# Patient Record
Sex: FEMALE | NOT HISPANIC OR LATINO | Employment: FULL TIME | URBAN - METROPOLITAN AREA
[De-identification: names, ages, dates, MRNs, and addresses within clinical notes are randomized per-mention and may not be internally consistent; named-entity substitution may affect disease eponyms.]

---

## 2023-11-21 ENCOUNTER — OFFICE VISIT (OUTPATIENT)
Dept: OBSTETRICS AND GYNECOLOGY | Facility: CLINIC | Age: 33
End: 2023-11-21
Payer: COMMERCIAL

## 2023-11-21 ENCOUNTER — PREP FOR PROCEDURE (OUTPATIENT)
Dept: OBSTETRICS AND GYNECOLOGY | Facility: HOSPITAL | Age: 33
End: 2023-11-21

## 2023-11-21 VITALS — WEIGHT: 128.7 LBS | DIASTOLIC BLOOD PRESSURE: 82 MMHG | SYSTOLIC BLOOD PRESSURE: 128 MMHG

## 2023-11-21 DIAGNOSIS — N83.201 CYST OF RIGHT OVARY: ICD-10-CM

## 2023-11-21 DIAGNOSIS — Z01.419 WELL WOMAN EXAM WITH ROUTINE GYNECOLOGICAL EXAM: Primary | ICD-10-CM

## 2023-11-21 DIAGNOSIS — Z30.09 ENCOUNTER FOR CONSULTATION FOR FEMALE STERILIZATION: Primary | ICD-10-CM

## 2023-11-21 DIAGNOSIS — Z30.09 ENCOUNTER FOR CONSULTATION FOR FEMALE STERILIZATION: ICD-10-CM

## 2023-11-21 PROCEDURE — 99385 PREV VISIT NEW AGE 18-39: CPT | Performed by: OBSTETRICS & GYNECOLOGY

## 2023-11-21 PROCEDURE — 87624 HPV HI-RISK TYP POOLED RSLT: CPT | Performed by: OBSTETRICS & GYNECOLOGY

## 2023-11-21 PROCEDURE — 1036F TOBACCO NON-USER: CPT | Performed by: OBSTETRICS & GYNECOLOGY

## 2023-11-21 PROCEDURE — 88175 CYTOPATH C/V AUTO FLUID REDO: CPT | Mod: TC | Performed by: OBSTETRICS & GYNECOLOGY

## 2023-11-21 RX ORDER — ACETAMINOPHEN 325 MG/1
975 TABLET ORAL ONCE
Status: CANCELLED | OUTPATIENT
Start: 2023-11-21 | End: 2023-11-21

## 2023-11-21 ASSESSMENT — ENCOUNTER SYMPTOMS
OCCASIONAL FEELINGS OF UNSTEADINESS: 0
DEPRESSION: 0
LOSS OF SENSATION IN FEET: 0

## 2023-11-21 ASSESSMENT — PATIENT HEALTH QUESTIONNAIRE - PHQ9
1. LITTLE INTEREST OR PLEASURE IN DOING THINGS: NOT AT ALL
SUM OF ALL RESPONSES TO PHQ9 QUESTIONS 1 AND 2: 0
2. FEELING DOWN, DEPRESSED OR HOPELESS: NOT AT ALL

## 2023-11-21 NOTE — ASSESSMENT & PLAN NOTE
Alternatives to lap b/l salpingectomy reviewed, and pt wishes to have tubes removed.  Risks/benefits/alternatives reviewed, and appropriate consents signed.

## 2023-11-21 NOTE — PROGRESS NOTES
"Myrna Townsend \"Dilip\" is a 33 y.o. female who is here for a routine exam. Periods are regular every 28-30 days, lasting a few days. Dysmenorrhea:none. Cyclic symptoms include none. No intermenstrual bleeding, spotting, or discharge.  Hx of migraines with combo OCP.  Notes pelvic pain, with hx of recurrent right ovarian cyst.  Pt also wants permanent sterilization.      Last pap:  2020  Last mammogram  Current contraception: none  History of abnormal Pap smear: no  Family history of uterine or ovarian cancer: no  Regular self breast exam: yes  History of abnormal mammogram: no  Family history of breast cancer: no  History of abnormal lipids: no  Menstrual History:  OB History          2    Para        Term                AB   2    Living             SAB   1    IAB   1    Ectopic        Multiple        Live Births                      Patient's last menstrual period was 10/30/2023 (approximate).         Past Medical History:   Diagnosis Date    Fibromyalgia        Past Surgical History:   Procedure Laterality Date    INDUCED       NASAL SEPTUM SURGERY      ORTHOPEDIC SURGERY Left     Arm fracture repair    OTHER SURGICAL HISTORY      egg donation x3       Social History     Socioeconomic History    Marital status:      Spouse name: Not on file    Number of children: 0    Years of education: Not on file    Highest education level: Not on file   Occupational History    Not on file   Tobacco Use    Smoking status: Never    Smokeless tobacco: Never   Vaping Use    Vaping Use: Never used   Substance and Sexual Activity    Alcohol use: Not Currently    Drug use: Yes     Types: Marijuana    Sexual activity: Yes     Birth control/protection: None   Other Topics Concern    Not on file   Social History Narrative    Not on file     Social Determinants of Health     Financial Resource Strain: Not on file   Food Insecurity: Not on file   Transportation Needs: Not on file "   Physical Activity: Not on file   Stress: Not on file   Social Connections: Not on file   Intimate Partner Violence: Not on file   Housing Stability: Not on file       Family History   Problem Relation Name Age of Onset    Diabetes Mother      Endometriosis Father's Sister      Diabetes Maternal Grandmother      Cancer Maternal Grandfather      Autoimmune disease Paternal Grandmother         Prior to Admission medications    Medication Sig Start Date End Date Taking? Authorizing Provider   loratadine (Claritin) 5 mg chewable tablet Chew 1 tablet (5 mg) once daily.    Historical Provider, MD       Allergies   Allergen Reactions    Amoxicillin Unknown    Gabapentin Nausea/vomiting    Penicillin Unknown              Objective   /82   Wt 58.4 kg (128 lb 11.2 oz)   LMP 10/30/2023 (Approximate)     Physical Exam  Vitals and nursing note reviewed.   Constitutional:       General: She is not in acute distress.     Appearance: Normal appearance. She is not ill-appearing.   HENT:      Head: Normocephalic and atraumatic.      Mouth/Throat:      Mouth: Mucous membranes are moist.      Pharynx: Oropharynx is clear.   Eyes:      Extraocular Movements: Extraocular movements intact.      Conjunctiva/sclera: Conjunctivae normal.      Pupils: Pupils are equal, round, and reactive to light.   Neck:      Thyroid: No thyroid mass, thyromegaly or thyroid tenderness.   Cardiovascular:      Rate and Rhythm: Normal rate and regular rhythm.      Pulses: Normal pulses.      Heart sounds: Normal heart sounds. No murmur heard.  Pulmonary:      Effort: Pulmonary effort is normal.      Breath sounds: No wheezing or rhonchi.   Chest:   Breasts:     Right: Normal. No swelling, bleeding, inverted nipple, mass, nipple discharge, skin change or tenderness.      Left: Normal. No swelling, bleeding, inverted nipple, mass, nipple discharge, skin change or tenderness.   Abdominal:      General: Bowel sounds are normal. There is no distension.       Palpations: There is no mass.      Tenderness: There is no abdominal tenderness. There is no guarding or rebound.      Hernia: No hernia is present. There is no hernia in the left inguinal area or right inguinal area.   Genitourinary:     General: Normal vulva.      Pubic Area: No rash.       Labia:         Right: No rash, tenderness, lesion or injury.         Left: No rash, tenderness, lesion or injury.       Urethra: No prolapse or urethral swelling.      Vagina: No signs of injury. No vaginal discharge, tenderness or prolapsed vaginal walls.      Cervix: No cervical motion tenderness, discharge, friability, lesion, erythema or cervical bleeding.      Uterus: Not deviated, not enlarged, not fixed, not tender and no uterine prolapse.       Adnexa:         Right: No mass, tenderness or fullness.          Left: No mass, tenderness or fullness.     Musculoskeletal:         General: No swelling, tenderness, deformity or signs of injury. Normal range of motion.      Cervical back: No rigidity.   Lymphadenopathy:      Cervical: No cervical adenopathy.      Upper Body:      Right upper body: No axillary adenopathy.      Left upper body: No axillary adenopathy.      Lower Body: No right inguinal adenopathy. No left inguinal adenopathy.   Skin:     General: Skin is warm.      Findings: No lesion or rash.   Neurological:      General: No focal deficit present.      Mental Status: She is alert and oriented to person, place, and time.   Psychiatric:         Mood and Affect: Mood normal.         Behavior: Behavior normal.         Thought Content: Thought content normal.         Judgment: Judgment normal.         Assessment    Problem List Items Addressed This Visit       Encounter for consultation for female sterilization     Alternatives to lap b/l salpingectomy reviewed, and pt wishes to have tubes removed.  Risks/benefits/alternatives reviewed, and appropriate consents signed.         Cyst of right ovary     Ultrasound  ordered.  If appropriate, can add ovarian cystectomy to procedure.           Relevant Orders    US pelvis transvaginal     Other Visit Diagnoses       Well woman exam with routine gynecological exam    -  Primary    Relevant Orders    THINPREP PAP             Follow up for post op.   All questions answered.  Await pap smear results.  Breast self exam technique reviewed and patient encouraged to perform self-exam monthly.  Contraception: tubal ligation.  Diagnosis explained in detail, including differential.  Discussed healthy lifestyle modifications.  Pelvic ultrasound..

## 2023-12-01 ENCOUNTER — HOSPITAL ENCOUNTER (OUTPATIENT)
Dept: RADIOLOGY | Facility: HOSPITAL | Age: 33
Discharge: HOME | End: 2023-12-01
Payer: COMMERCIAL

## 2023-12-01 DIAGNOSIS — N83.201 CYST OF RIGHT OVARY: ICD-10-CM

## 2023-12-01 PROCEDURE — 76856 US EXAM PELVIC COMPLETE: CPT

## 2023-12-14 ENCOUNTER — LAB (OUTPATIENT)
Dept: LAB | Facility: LAB | Age: 33
End: 2023-12-14
Payer: COMMERCIAL

## 2023-12-14 ENCOUNTER — PRE-ADMISSION TESTING (OUTPATIENT)
Dept: PREADMISSION TESTING | Facility: HOSPITAL | Age: 33
End: 2023-12-14
Payer: COMMERCIAL

## 2023-12-14 ENCOUNTER — LAB REQUISITION (OUTPATIENT)
Dept: LAB | Facility: HOSPITAL | Age: 33
End: 2023-12-14
Payer: COMMERCIAL

## 2023-12-14 VITALS
TEMPERATURE: 96.8 F | DIASTOLIC BLOOD PRESSURE: 95 MMHG | WEIGHT: 127.43 LBS | SYSTOLIC BLOOD PRESSURE: 139 MMHG | HEART RATE: 96 BPM | OXYGEN SATURATION: 99 % | HEIGHT: 62 IN | BODY MASS INDEX: 23.45 KG/M2

## 2023-12-14 DIAGNOSIS — Z30.09 ENCOUNTER FOR CONSULTATION FOR FEMALE STERILIZATION: Primary | ICD-10-CM

## 2023-12-14 DIAGNOSIS — Z30.09 ENCOUNTER FOR CONSULTATION FOR FEMALE STERILIZATION: ICD-10-CM

## 2023-12-14 DIAGNOSIS — Z30.09 ENCOUNTER FOR OTHER GENERAL COUNSELING AND ADVICE ON CONTRACEPTION: ICD-10-CM

## 2023-12-14 LAB
ABO GROUP (TYPE) IN BLOOD: NORMAL
ANION GAP SERPL CALC-SCNC: 11 MMOL/L
ANTIBODY SCREEN: NORMAL
BUN SERPL-MCNC: 9 MG/DL (ref 8–25)
CALCIUM SERPL-MCNC: 9.6 MG/DL (ref 8.5–10.4)
CHLORIDE SERPL-SCNC: 104 MMOL/L (ref 97–107)
CO2 SERPL-SCNC: 26 MMOL/L (ref 24–31)
CREAT SERPL-MCNC: 0.7 MG/DL (ref 0.4–1.6)
ERYTHROCYTE [DISTWIDTH] IN BLOOD BY AUTOMATED COUNT: 12.9 % (ref 11.5–14.5)
GFR SERPL CREATININE-BSD FRML MDRD: >90 ML/MIN/1.73M*2
GLUCOSE SERPL-MCNC: 95 MG/DL (ref 65–99)
HCT VFR BLD AUTO: 40.4 % (ref 36–46)
HGB BLD-MCNC: 13.1 G/DL (ref 12–16)
MCH RBC QN AUTO: 29.2 PG (ref 26–34)
MCHC RBC AUTO-ENTMCNC: 32.4 G/DL (ref 32–36)
MCV RBC AUTO: 90 FL (ref 80–100)
NRBC BLD-RTO: 0 /100 WBCS (ref 0–0)
PLATELET # BLD AUTO: 320 X10*3/UL (ref 150–450)
POTASSIUM SERPL-SCNC: 4.6 MMOL/L (ref 3.4–5.1)
RBC # BLD AUTO: 4.49 X10*6/UL (ref 4–5.2)
RH FACTOR (ANTIGEN D): NORMAL
SODIUM SERPL-SCNC: 141 MMOL/L (ref 133–145)
WBC # BLD AUTO: 6.4 X10*3/UL (ref 4.4–11.3)

## 2023-12-14 PROCEDURE — 85027 COMPLETE CBC AUTOMATED: CPT

## 2023-12-14 PROCEDURE — 36415 COLL VENOUS BLD VENIPUNCTURE: CPT

## 2023-12-14 PROCEDURE — 80048 BASIC METABOLIC PNL TOTAL CA: CPT

## 2023-12-14 PROCEDURE — 94760 N-INVAS EAR/PLS OXIMETRY 1: CPT

## 2023-12-14 PROCEDURE — 99203 OFFICE O/P NEW LOW 30 MIN: CPT | Performed by: PHYSICIAN ASSISTANT

## 2023-12-14 ASSESSMENT — CHADS2 SCORE
HYPERTENSION: NO
CHADS2 SCORE: 0
PRIOR STROKE OR TIA OR THROMBOEMBOLISM: NO
CHF: NO
AGE GREATER THAN OR EQUAL TO 75: NO
DIABETES: NO

## 2023-12-14 ASSESSMENT — PAIN - FUNCTIONAL ASSESSMENT: PAIN_FUNCTIONAL_ASSESSMENT: 0-10

## 2023-12-14 ASSESSMENT — LIFESTYLE VARIABLES: SMOKING_STATUS: NONSMOKER

## 2023-12-14 ASSESSMENT — PAIN SCALES - GENERAL: PAINLEVEL_OUTOF10: 0 - NO PAIN

## 2023-12-14 NOTE — PREPROCEDURE INSTRUCTIONS
Medication List            Accurate as of December 14, 2023  7:19 AM. Always use your most recent med list.                loratadine 5 mg chewable tablet  Commonly known as: Claritin                              NPO Instructions:    Do not eat any food after midnight the night before your surgery/procedure.    Additional Instructions:     Day of Surgery:  Wear  comfortable loose fitting clothing  All jewelry and valuables should be left at home    PAT DISCHARGE INSTRUCTIONS    Please call the Same Day Surgery (SDS) Department of the hospital where your procedure will be performed after 2:00 PM the day before your surgery. If you are scheduled on a Monday, or a Tuesday following a Monday holiday, you will need to call on the last business day prior to your surgery.    [facility]  [address] [facility], [id number] [phone]    [facility]  [address] [place] [address] [phone]    [facility]  [address] [facility].  [place] [id number] [phone]    Please let your surgeon know if:      You develop any open sores, shingles, burning or painful urination as these may increase your risk of an infection.   You no longer wish to have the surgery.   Any other personal circumstances change that may lead to the need to cancel or defer this surgery-such as being sick or getting admitted to any hospital within one week of your planned procedure.    Your contact details change, such as a change of address or phone number.    Starting now:     Please DO NOT drink alcohol or smoke for 24 hours before surgery. It is well known that quitting smoking can make a huge difference to your health and recovery from surgery. The longer you abstain from smoking, the better your chances of a healthy recovery. If you need help with quitting, call 3-755-QUIT-NOW to be connected to a trained counselor who will discuss  the best methods to help you quit.     Before your surgery:    Please stop all supplements 7 days prior to surgery. Or as directed by your surgeon.   Please stop taking NSAID pain medicine such as Advil and Motrin 7 days before surgery.    If you develop any fever, cough, cold, rashes, cuts, scratches, scrapes, urinary symptoms or infection anywhere on your body (including teeth and gums) prior to surgery, please call your surgeon’s office as soon as possible. This may require treatment to reduce the chance of cancellation on the day of surgery.    The day before your surgery:   DIET- Do not eat any food after MIDNIGHT. May have 10 ounces of CLEAR LIQUIDS until TWO HOURS before your arrival time. This includes water, black tea or coffee (no milk ir cream), apple juice and electrolyte drinks (Gatorade). May chew gum until TWO hours before your surgery time.   Get a good night’s rest.  Use the special soap for bathing if you have been instructed to use one.    Scheduled surgery times may change and you will be notified if this occurs - please check your personal voicemail for any updates.     On the morning of surgery:   Wear comfortable, loose fitting clothes which open in the front. Please do not wear moisturizers, creams, lotions, makeup or perfume.    Please bring with you to surgery:   Photo ID and insurance card   Current list of medicines and allergies   Pacemaker/ Defibrillator/Heart stent cards   CPAP machine and mask    Slings/ splints/ crutches   A copy of your complete advanced directive/DHPOA.    Please do NOT bring with you to surgery:   All jewelry and valuables should be left at home.   Prosthetic devices such as contact lenses, hearing aids, dentures, eyelash extensions, hairpins and body piercings must be removed prior to going in to the surgical suite.    After outpatient surgery:   A responsible adult MUST accompany you at the time of discharge and stay with you for 24 hours after your surgery. You  may NOT drive yourself home after surgery.    Do not drive, operate machinery, make critical decisions or do activities that require co-ordination or balance until after a night’s sleep.   Do not drink alcoholic beverages for 24 hours.   Instructions for resuming your medications will be provided by your surgeon.    CALL YOUR DOCTOR AFTER SURGERY IF YOU HAVE:     Chills and/or a fever of 101° F or higher.    Redness, swelling, pus or drainage from your surgical wound or a bad smell from the wound.    Lightheadedness, fainting or confusion.    Persistent vomiting (throwing up) and are not able to eat or drink for 12 hours.    Three or more loose, watery bowel movements in 24 hours (diarrhea).   Difficulty or pain while urinating( after non-urological surgery)    Pain and swelling in your legs, especially if it is only on one side.    Difficulty breathing or are breathing faster than normal.    Any new concerning symptoms.

## 2023-12-14 NOTE — H&P (VIEW-ONLY)
"CPM/PAT Evaluation       Name: Aaliyah Townsend (Aaliyah Townsend \"Dilip\")  /Age: 1990/33 y.o.     In-Person       Chief Complaint: Desires sterilization    HPI 33-year-old female desires sterilization.  Patient has history of anxiety, postop nausea vomiting and fibromyalgia.  Patient is scheduled for laparoscopic bilateral salpingectomy 2023    Past Medical History:   Diagnosis Date    Anxiety 2019    Treating with talk therapy/ CBT    Anxiety 2019    Treating with talk therapy/ CBT    Chronic headaches     Chronic pain disorder     Fibromyalgia     Fibromyalgia, primary 2021    Not sure this is a correct diagnosis, I think it might just be neck pain and  low iron/easily bruise    Fibromyalgia, primary 2021    Not sure this is a correct diagnosis, I think it might just be neck pain and  low iron/easily bruise    Fractures , ,     All healed, arm, foot, ankle    Joint pain     Neck , shoulder, hip and knee pain on right side    Other specified health status     Known health problems: none    PONV (postoperative nausea and vomiting) 2014    After egg retrieval surgery       Past Surgical History:   Procedure Laterality Date    INDUCED       NASAL SEPTUM SURGERY      ORTHOPEDIC SURGERY Left     Arm fracture repair    OTHER SURGICAL HISTORY  2014    History Of Prior Surgery    OTHER SURGICAL HISTORY      egg donation x3       Patient  reports being sexually active and has had partner(s) who are male. She reports using the following method of birth control/protection: None.    Family History   Problem Relation Name Age of Onset    Diabetes Mother Sandra Perez     Depression Mother Sandra Perez     Drug abuse Mother Sandra Perez     Endometriosis Father's Sister      Diabetes Maternal Grandmother Kiara Perez     Cancer Maternal Grandfather Kiara Perez     Autoimmune disease Paternal Grandmother Waleska Garcia     Miscarriages / Stillbirths Paternal " "Grandmother Waleska Garcia     Drug abuse Brother Tee Garcia     Miscarriages / Stillbirths Sister Yuri Garcia     Miscarriages / Stillbirths Other Bindu Camacho (paternal aunt)        Allergies   Allergen Reactions    Gabapentin Nausea/vomiting    Amoxicillin Rash and Nausea/vomiting    Penicillin Rash and Nausea/vomiting       Current Outpatient Medications   Medication Instructions    loratadine (CLARITIN) 5 mg, oral, Daily     Review of Systems   All other systems reviewed and are negative.       Physical Exam  Vitals reviewed. Physical exam within normal limits.   Constitutional:       Appearance: Normal appearance.   HENT:      Head: Normocephalic and atraumatic.      Mouth/Throat:      Mouth: Mucous membranes are moist.   Eyes:      Extraocular Movements: Extraocular movements intact.      Pupils: Pupils are equal, round, and reactive to light.   Cardiovascular:      Rate and Rhythm: Normal rate and regular rhythm.      Pulses: Normal pulses.      Heart sounds: Normal heart sounds.   Pulmonary:      Effort: Pulmonary effort is normal.      Breath sounds: Normal breath sounds.   Abdominal:      General: Bowel sounds are normal.      Palpations: Abdomen is soft.   Musculoskeletal:         General: Normal range of motion.      Cervical back: Normal range of motion.   Skin:     General: Skin is warm and dry.   Neurological:      General: No focal deficit present.      Mental Status: She is alert and oriented to person, place, and time.   Psychiatric:         Mood and Affect: Mood normal.         Behavior: Behavior normal.         Thought Content: Thought content normal.         Judgment: Judgment normal.          PAT AIRWAY:   Airway:     Mallampati::  II    TM distance::  >3 FB    Neck ROM::  Full      Vitals  Heart Rate:  [96]   Temp:  [36 °C (96.8 °F)]   BP: (139)/(95)   Height:  [157.5 cm (5' 2\")]   Weight:  [57.8 kg (127 lb 6.8 oz)]   SpO2:  [99 %]        DASI Risk Score    No data to display       Caprini " DVT Assessment    No data to display       Modified Frailty Index    No data to display       CHADS2 Stroke Risk  Current as of 5 hours ago        N/A 3 - 100%: High Risk   2 - 3%: Medium Risk   0 - 2%: Low Risk     Last Change: N/A          This score determines the patient's risk of having a stroke if the patient has atrial fibrillation.        This score is not applicable to this patient. Components are not calculated.          Revised Cardiac Risk Index    No data to display       Apfel Simplified Score    No data to display       Risk Analysis Index Results This Encounter    No data found in the last 1 encounters.       Stop Bang Score      Flowsheet Row Most Recent Value   Do you snore loudly? 0   Do you often feel tired or fatigued after your sleep? 0   Has anyone ever observed you stop breathing in your sleep? 0   Do you have or are you being treated for high blood pressure? 0   Recent BMI (Calculated) 23.3   Is BMI greater than 35 kg/m2? 0=No   Age older than 50 years old? 0=No   Is your neck circumference greater than 17 inches (Male) or 16 inches (Female)? 0   Gender - Male 0=No   STOP-BANG Total Score 0            Assessment and Plan:     1.  Desires sterilization   Plan: Laparoscopic bilateral salpingectomy 12/21/2023    2.  ASA 2

## 2023-12-20 ENCOUNTER — ANESTHESIA EVENT (OUTPATIENT)
Dept: OPERATING ROOM | Facility: HOSPITAL | Age: 33
End: 2023-12-20
Payer: COMMERCIAL

## 2023-12-21 ENCOUNTER — ANESTHESIA (OUTPATIENT)
Dept: OPERATING ROOM | Facility: HOSPITAL | Age: 33
End: 2023-12-21
Payer: COMMERCIAL

## 2023-12-21 ENCOUNTER — PHARMACY VISIT (OUTPATIENT)
Dept: PHARMACY | Facility: CLINIC | Age: 33
End: 2023-12-21
Payer: COMMERCIAL

## 2023-12-21 ENCOUNTER — HOSPITAL ENCOUNTER (OUTPATIENT)
Facility: HOSPITAL | Age: 33
Setting detail: OUTPATIENT SURGERY
Discharge: HOME | End: 2023-12-21
Attending: OBSTETRICS & GYNECOLOGY | Admitting: OBSTETRICS & GYNECOLOGY
Payer: COMMERCIAL

## 2023-12-21 VITALS
RESPIRATION RATE: 20 BRPM | OXYGEN SATURATION: 99 % | TEMPERATURE: 97.2 F | SYSTOLIC BLOOD PRESSURE: 107 MMHG | HEART RATE: 72 BPM | DIASTOLIC BLOOD PRESSURE: 75 MMHG

## 2023-12-21 DIAGNOSIS — Z30.09 ENCOUNTER FOR CONSULTATION FOR FEMALE STERILIZATION: ICD-10-CM

## 2023-12-21 LAB
ABO GROUP (TYPE) IN BLOOD: NORMAL
PREGNANCY TEST URINE, POC: NEGATIVE
RH FACTOR (ANTIGEN D): NORMAL

## 2023-12-21 PROCEDURE — 3700000001 HC GENERAL ANESTHESIA TIME - INITIAL BASE CHARGE: Performed by: OBSTETRICS & GYNECOLOGY

## 2023-12-21 PROCEDURE — 2500000004 HC RX 250 GENERAL PHARMACY W/ HCPCS (ALT 636 FOR OP/ED): Performed by: NURSE ANESTHETIST, CERTIFIED REGISTERED

## 2023-12-21 PROCEDURE — 2500000005 HC RX 250 GENERAL PHARMACY W/O HCPCS: Performed by: NURSE ANESTHETIST, CERTIFIED REGISTERED

## 2023-12-21 PROCEDURE — 81025 URINE PREGNANCY TEST: CPT | Performed by: ANESTHESIOLOGY

## 2023-12-21 PROCEDURE — 7100000002 HC RECOVERY ROOM TIME - EACH INCREMENTAL 1 MINUTE: Performed by: OBSTETRICS & GYNECOLOGY

## 2023-12-21 PROCEDURE — 3700000002 HC GENERAL ANESTHESIA TIME - EACH INCREMENTAL 1 MINUTE: Performed by: OBSTETRICS & GYNECOLOGY

## 2023-12-21 PROCEDURE — 7100000009 HC PHASE TWO TIME - INITIAL BASE CHARGE: Performed by: OBSTETRICS & GYNECOLOGY

## 2023-12-21 PROCEDURE — 94760 N-INVAS EAR/PLS OXIMETRY 1: CPT

## 2023-12-21 PROCEDURE — 2500000004 HC RX 250 GENERAL PHARMACY W/ HCPCS (ALT 636 FOR OP/ED): Performed by: OBSTETRICS & GYNECOLOGY

## 2023-12-21 PROCEDURE — 58661 LAPAROSCOPY REMOVE ADNEXA: CPT | Performed by: OBSTETRICS & GYNECOLOGY

## 2023-12-21 PROCEDURE — 36415 COLL VENOUS BLD VENIPUNCTURE: CPT | Performed by: ANESTHESIOLOGY

## 2023-12-21 PROCEDURE — RXMED WILLOW AMBULATORY MEDICATION CHARGE

## 2023-12-21 PROCEDURE — A4649 SURGICAL SUPPLIES: HCPCS | Performed by: OBSTETRICS & GYNECOLOGY

## 2023-12-21 PROCEDURE — A58661 PR LAP,RMV  ADNEXAL STRUCTURE: Performed by: ANESTHESIOLOGY

## 2023-12-21 PROCEDURE — 2720000007 HC OR 272 NO HCPCS: Performed by: OBSTETRICS & GYNECOLOGY

## 2023-12-21 PROCEDURE — 2500000004 HC RX 250 GENERAL PHARMACY W/ HCPCS (ALT 636 FOR OP/ED): Performed by: ANESTHESIOLOGY

## 2023-12-21 PROCEDURE — 7100000001 HC RECOVERY ROOM TIME - INITIAL BASE CHARGE: Performed by: OBSTETRICS & GYNECOLOGY

## 2023-12-21 PROCEDURE — 3600000003 HC OR TIME - INITIAL BASE CHARGE - PROCEDURE LEVEL THREE: Performed by: OBSTETRICS & GYNECOLOGY

## 2023-12-21 PROCEDURE — 3600000008 HC OR TIME - EACH INCREMENTAL 1 MINUTE - PROCEDURE LEVEL THREE: Performed by: OBSTETRICS & GYNECOLOGY

## 2023-12-21 PROCEDURE — 7100000010 HC PHASE TWO TIME - EACH INCREMENTAL 1 MINUTE: Performed by: OBSTETRICS & GYNECOLOGY

## 2023-12-21 PROCEDURE — A58661 PR LAP,RMV  ADNEXAL STRUCTURE: Performed by: NURSE ANESTHETIST, CERTIFIED REGISTERED

## 2023-12-21 PROCEDURE — 2500000005 HC RX 250 GENERAL PHARMACY W/O HCPCS: Performed by: OBSTETRICS & GYNECOLOGY

## 2023-12-21 PROCEDURE — 88302 TISSUE EXAM BY PATHOLOGIST: CPT | Mod: TC | Performed by: OBSTETRICS & GYNECOLOGY

## 2023-12-21 PROCEDURE — 88302 TISSUE EXAM BY PATHOLOGIST: CPT | Performed by: PATHOLOGY

## 2023-12-21 RX ORDER — IBUPROFEN 600 MG/1
600 TABLET ORAL EVERY 6 HOURS PRN
Qty: 20 TABLET | Refills: 0 | Status: SHIPPED | OUTPATIENT
Start: 2023-12-21

## 2023-12-21 RX ORDER — SODIUM CHLORIDE, SODIUM LACTATE, POTASSIUM CHLORIDE, CALCIUM CHLORIDE 600; 310; 30; 20 MG/100ML; MG/100ML; MG/100ML; MG/100ML
100 INJECTION, SOLUTION INTRAVENOUS CONTINUOUS
Status: DISCONTINUED | OUTPATIENT
Start: 2023-12-21 | End: 2023-12-21 | Stop reason: HOSPADM

## 2023-12-21 RX ORDER — KETOROLAC TROMETHAMINE 15 MG/ML
INJECTION, SOLUTION INTRAMUSCULAR; INTRAVENOUS AS NEEDED
Status: DISCONTINUED | OUTPATIENT
Start: 2023-12-21 | End: 2023-12-21

## 2023-12-21 RX ORDER — PROPOFOL 10 MG/ML
INJECTION, EMULSION INTRAVENOUS AS NEEDED
Status: DISCONTINUED | OUTPATIENT
Start: 2023-12-21 | End: 2023-12-21

## 2023-12-21 RX ORDER — MIDAZOLAM HYDROCHLORIDE 1 MG/ML
INJECTION, SOLUTION INTRAMUSCULAR; INTRAVENOUS AS NEEDED
Status: DISCONTINUED | OUTPATIENT
Start: 2023-12-21 | End: 2023-12-21

## 2023-12-21 RX ORDER — ONDANSETRON HYDROCHLORIDE 2 MG/ML
4 INJECTION, SOLUTION INTRAVENOUS ONCE AS NEEDED
Status: DISCONTINUED | OUTPATIENT
Start: 2023-12-21 | End: 2023-12-21 | Stop reason: HOSPADM

## 2023-12-21 RX ORDER — ACETAMINOPHEN 325 MG/1
650 TABLET ORAL EVERY 6 HOURS PRN
Qty: 20 TABLET | Refills: 0 | Status: SHIPPED | OUTPATIENT
Start: 2023-12-21

## 2023-12-21 RX ORDER — MIDAZOLAM HYDROCHLORIDE 1 MG/ML
1 INJECTION, SOLUTION INTRAMUSCULAR; INTRAVENOUS ONCE AS NEEDED
Status: DISCONTINUED | OUTPATIENT
Start: 2023-12-21 | End: 2023-12-21 | Stop reason: HOSPADM

## 2023-12-21 RX ORDER — ROCURONIUM BROMIDE 10 MG/ML
INJECTION, SOLUTION INTRAVENOUS AS NEEDED
Status: DISCONTINUED | OUTPATIENT
Start: 2023-12-21 | End: 2023-12-21

## 2023-12-21 RX ORDER — ONDANSETRON HYDROCHLORIDE 2 MG/ML
INJECTION, SOLUTION INTRAVENOUS AS NEEDED
Status: DISCONTINUED | OUTPATIENT
Start: 2023-12-21 | End: 2023-12-21

## 2023-12-21 RX ORDER — DEXAMETHASONE SODIUM PHOSPHATE 4 MG/ML
INJECTION, SOLUTION INTRA-ARTICULAR; INTRALESIONAL; INTRAMUSCULAR; INTRAVENOUS; SOFT TISSUE AS NEEDED
Status: DISCONTINUED | OUTPATIENT
Start: 2023-12-21 | End: 2023-12-21

## 2023-12-21 RX ORDER — LIDOCAINE HYDROCHLORIDE 10 MG/ML
INJECTION INFILTRATION; PERINEURAL AS NEEDED
Status: DISCONTINUED | OUTPATIENT
Start: 2023-12-21 | End: 2023-12-21

## 2023-12-21 RX ORDER — MEPERIDINE HYDROCHLORIDE 25 MG/ML
12.5 INJECTION INTRAMUSCULAR; INTRAVENOUS; SUBCUTANEOUS EVERY 10 MIN PRN
Status: DISCONTINUED | OUTPATIENT
Start: 2023-12-21 | End: 2023-12-21 | Stop reason: HOSPADM

## 2023-12-21 RX ORDER — ALBUTEROL SULFATE 0.83 MG/ML
2.5 SOLUTION RESPIRATORY (INHALATION) ONCE
Status: DISCONTINUED | OUTPATIENT
Start: 2023-12-21 | End: 2023-12-21 | Stop reason: HOSPADM

## 2023-12-21 RX ORDER — FENTANYL CITRATE 50 UG/ML
50 INJECTION, SOLUTION INTRAMUSCULAR; INTRAVENOUS EVERY 5 MIN PRN
Status: DISCONTINUED | OUTPATIENT
Start: 2023-12-21 | End: 2023-12-21 | Stop reason: HOSPADM

## 2023-12-21 RX ORDER — ACETAMINOPHEN 325 MG/1
975 TABLET ORAL ONCE
Status: COMPLETED | OUTPATIENT
Start: 2023-12-21 | End: 2023-12-21

## 2023-12-21 RX ORDER — LIDOCAINE HYDROCHLORIDE AND EPINEPHRINE 10; 10 MG/ML; UG/ML
INJECTION, SOLUTION INFILTRATION; PERINEURAL AS NEEDED
Status: DISCONTINUED | OUTPATIENT
Start: 2023-12-21 | End: 2023-12-21 | Stop reason: HOSPADM

## 2023-12-21 RX ORDER — FENTANYL CITRATE 50 UG/ML
INJECTION, SOLUTION INTRAMUSCULAR; INTRAVENOUS AS NEEDED
Status: DISCONTINUED | OUTPATIENT
Start: 2023-12-21 | End: 2023-12-21

## 2023-12-21 RX ORDER — LIDOCAINE HYDROCHLORIDE 10 MG/ML
0.1 INJECTION INFILTRATION; PERINEURAL ONCE
Status: DISCONTINUED | OUTPATIENT
Start: 2023-12-21 | End: 2023-12-21 | Stop reason: HOSPADM

## 2023-12-21 RX ORDER — HYDROCODONE BITARTRATE AND ACETAMINOPHEN 5; 325 MG/1; MG/1
1 TABLET ORAL EVERY 6 HOURS PRN
Qty: 12 TABLET | Refills: 0 | Status: SHIPPED | OUTPATIENT
Start: 2023-12-21 | End: 2023-12-28

## 2023-12-21 RX ADMIN — ONDANSETRON HYDROCHLORIDE 4 MG: 2 INJECTION INTRAMUSCULAR; INTRAVENOUS at 08:46

## 2023-12-21 RX ADMIN — PROPOFOL 200 MG: 10 INJECTION, EMULSION INTRAVENOUS at 08:06

## 2023-12-21 RX ADMIN — DEXAMETHASONE SODIUM PHOSPHATE 8 MG: 4 INJECTION, SOLUTION INTRA-ARTICULAR; INTRALESIONAL; INTRAMUSCULAR; INTRAVENOUS; SOFT TISSUE at 08:13

## 2023-12-21 RX ADMIN — MEPERIDINE HYDROCHLORIDE 12.5 MG: 25 INJECTION INTRAMUSCULAR; INTRAVENOUS; SUBCUTANEOUS at 09:38

## 2023-12-21 RX ADMIN — ACETAMINOPHEN 975 MG: 325 TABLET ORAL at 07:10

## 2023-12-21 RX ADMIN — HYDROMORPHONE HYDROCHLORIDE 0.2 MG: 0.2 INJECTION, SOLUTION INTRAMUSCULAR; INTRAVENOUS; SUBCUTANEOUS at 09:16

## 2023-12-21 RX ADMIN — LIDOCAINE HYDROCHLORIDE 4 ML: 10 INJECTION, SOLUTION INFILTRATION; PERINEURAL at 08:06

## 2023-12-21 RX ADMIN — SODIUM CHLORIDE, SODIUM LACTATE, POTASSIUM CHLORIDE, AND CALCIUM CHLORIDE: 600; 310; 30; 20 INJECTION, SOLUTION INTRAVENOUS at 09:00

## 2023-12-21 RX ADMIN — ROCURONIUM BROMIDE 30 MG: 10 INJECTION, SOLUTION INTRAVENOUS at 08:06

## 2023-12-21 RX ADMIN — SUGAMMADEX 150 MG: 100 INJECTION, SOLUTION INTRAVENOUS at 08:56

## 2023-12-21 RX ADMIN — MIDAZOLAM HYDROCHLORIDE 2 MG: 1 INJECTION, SOLUTION INTRAMUSCULAR; INTRAVENOUS at 07:58

## 2023-12-21 RX ADMIN — KETOROLAC TROMETHAMINE 15 MG: 15 INJECTION, SOLUTION INTRAMUSCULAR; INTRAVENOUS at 08:46

## 2023-12-21 RX ADMIN — SODIUM CHLORIDE, SODIUM LACTATE, POTASSIUM CHLORIDE, AND CALCIUM CHLORIDE: 600; 310; 30; 20 INJECTION, SOLUTION INTRAVENOUS at 07:56

## 2023-12-21 RX ADMIN — FENTANYL CITRATE 50 MCG: 0.05 INJECTION, SOLUTION INTRAMUSCULAR; INTRAVENOUS at 08:06

## 2023-12-21 RX ADMIN — MEPERIDINE HYDROCHLORIDE 12.5 MG: 25 INJECTION INTRAMUSCULAR; INTRAVENOUS; SUBCUTANEOUS at 09:26

## 2023-12-21 RX ADMIN — FENTANYL CITRATE 50 MCG: 0.05 INJECTION, SOLUTION INTRAMUSCULAR; INTRAVENOUS at 08:18

## 2023-12-21 ASSESSMENT — PAIN SCALES - GENERAL
PAINLEVEL_OUTOF10: 3
PAINLEVEL_OUTOF10: 0 - NO PAIN
PAINLEVEL_OUTOF10: 4
PAINLEVEL_OUTOF10: 0 - NO PAIN
PAINLEVEL_OUTOF10: 4
PAINLEVEL_OUTOF10: 4

## 2023-12-21 ASSESSMENT — PAIN - FUNCTIONAL ASSESSMENT
PAIN_FUNCTIONAL_ASSESSMENT: 0-10

## 2023-12-21 ASSESSMENT — COLUMBIA-SUICIDE SEVERITY RATING SCALE - C-SSRS
1. IN THE PAST MONTH, HAVE YOU WISHED YOU WERE DEAD OR WISHED YOU COULD GO TO SLEEP AND NOT WAKE UP?: NO
6. HAVE YOU EVER DONE ANYTHING, STARTED TO DO ANYTHING, OR PREPARED TO DO ANYTHING TO END YOUR LIFE?: NO
2. HAVE YOU ACTUALLY HAD ANY THOUGHTS OF KILLING YOURSELF?: NO

## 2023-12-21 ASSESSMENT — PAIN DESCRIPTION - LOCATION: LOCATION: ABDOMEN

## 2023-12-21 NOTE — ANESTHESIA POSTPROCEDURE EVALUATION
"Patient: Aaliyah Townsend \"Dilip\"    Procedure Summary       Date: 12/21/23 Room / Location: [facility] / [facility]    Anesthesia Start: 0756 Anesthesia Stop: 0909    Procedure: Salpingectomy Laparoscopy (Bilateral) Diagnosis:       Encounter for consultation for female sterilization      (Encounter for consultation for female sterilization [Z30.09])    Surgeons: Jaclyn Reese DO Responsible Provider: Kevin Min DO    Anesthesia Type: general ASA Status: 1            Anesthesia Type: general    Vitals Value Taken Time   BP 96/58 12/21/23 1015   Temp 36.2 °C (97.2 °F) 12/21/23 1010   Pulse 68 12/21/23 1018   Resp 14 12/21/23 1018   SpO2 100 % 12/21/23 1023   Vitals shown include unvalidated device data.    Anesthesia Post Evaluation    Patient location during evaluation: bedside  Patient participation: complete - patient participated  Level of consciousness: awake  Pain management: adequate  Airway patency: patent  Cardiovascular status: acceptable  Respiratory status: acceptable  Hydration status: acceptable  Postoperative Nausea and Vomiting: none        There were no known notable events for this encounter.    "

## 2023-12-21 NOTE — ANESTHESIA PROCEDURE NOTES
Airway  Date/Time: 12/21/2023 8:10 AM  Urgency: elective    Airway not difficult    Staffing  Performed: SRNA   Authorized by: Kevin Min DO    Performed by: MONSERRAT Watkins-CRNA  Patient location during procedure: OR    Indications and Patient Condition  Indications for airway management: anesthesia  Spontaneous Ventilation: absent  Sedation level: deep  Preoxygenated: yes  Patient position: sniffing  MILS maintained throughout  Mask difficulty assessment: 1 - vent by mask    Final Airway Details  Final airway type: endotracheal airway      Successful airway: ETT  Cuffed: yes   Successful intubation technique: direct laryngoscopy  Facilitating devices/methods: intubating stylet  Endotracheal tube insertion site: oral  Blade: Devon  Blade size: #3  ETT size (mm): 7.0  Cormack-Lehane Classification: grade I - full view of glottis  Placement verified by: chest auscultation and capnometry   Measured from: teeth  ETT to teeth (cm): 21  Number of attempts at approach: 1    Additional Comments  Atraumatic intubation per SRNA under supervision

## 2023-12-21 NOTE — ANESTHESIA PREPROCEDURE EVALUATION
"Patient: Aaliyah Townsend \"Dilip\"    Procedure Information       Date/Time: 12/21/23 0745    Procedure: Salpingectomy Laparoscopy (Bilateral)    Location: [facility] / [facility]    Surgeons: Jaclyn Reese, DO            Relevant Problems   Anesthesia (within normal limits)       Clinical information reviewed:   Tobacco  Allergies  Meds   Med Hx  Surg Hx  OB Status  Fam Hx  Soc   Hx        NPO Detail:  NPO/Void Status  Date of Last Liquid: 12/20/23  Time of Last Liquid: 2000  Date of Last Solid: 12/20/23  Time of Last Solid: 2000  Time of Last Void: 0647         Physical Exam    Airway  Mallampati: I  TM distance: >3 FB     Cardiovascular    Dental - normal exam     Pulmonary    Abdominal          Anesthesia Plan    ASA 1     general     intravenous induction   Anesthetic plan and risks discussed with patient.    "

## 2023-12-21 NOTE — OP NOTE
"Salpingectomy Laparoscopy (B) Operative Note     Date: 2023  OR Location: [facility]    Name: Aaliyah Townsend \"Dilip\", : 1990, Age: 33 y.o., MRN: [id number], Sex: female    Diagnosis  Pre-op Diagnosis     * Encounter for consultation for female sterilization [Z30.09] Post-op Diagnosis     * Encounter for consultation for female sterilization [Z30.09]     Procedures  Salpingectomy Laparoscopy  22266 - IL LAPAROSCOPY W/RMVL ADNEXAL STRUCTURES    IL LAPAROSCOPY W/RMVL ADNEXAL STRUCTURES [77431]  Surgeons      * Jaclyn Reese - Primary    Resident/Fellow/Other Assistant:  Surgeon(s) and Role: : none    Procedure Summary  Anesthesia: General  ASA: I  Anesthesia Staff: Anesthesiologist: Kevin Min DO  CRNA: MONSERRAT Watkins-PATI  Estimated Blood Loss: mL  Intra-op Medications:   Medication Name Total Dose   lidocaine-epinephrine (Xylocaine W/EPI) 1 %-1:100,000 injection 10 mL   lactated Ringer's bolus 500 mL Cannot be calculated              Anesthesia Record               Intraprocedure I/O Totals          Intake    lactated Ringer's bolus 500 mL 1000.00 mL    Total Intake 1000 mL       Output    Urine 300 mL    Est. Blood Loss 5 mL    NG/OG Tube Output 50 mL    Total Output 355 mL       Net    Net Volume 645 mL          Specimen:   ID Type Source Tests Collected by Time   1 : BILATERAL FALLOPIAN TUBES Tissue FALLOPIAN TUBE SALPINGECTOMY LEFT AND RIGHT SURGICAL PATHOLOGY EXAM Jaclyn Reese DO 2023 0843        Staff:   Circulator: Dez Alfaro RN; Nidhi Pope RN  Scrub Person: Kyle Miller RN         Drains and/or Catheters:   [REMOVED] NG/OG/Feeding Tube 16 Fr (Removed)       Tourniquet Times:         Implants:     Findings: uterus sounded to 7 cm.  Corpus luteum on rt ovary, multiple paratubal cysts present.      Indications: Dilip Townsend is an 33 y.o. female who is having surgery for Encounter for consultation for female sterilization [Z30.09].     The patient was " seen in the preoperative area. The risks, benefits, complications, treatment options, non-operative alternatives, expected recovery and outcomes were discussed with the patient. The possibilities of reaction to medication, pulmonary aspiration, injury to surrounding structures, bleeding, recurrent infection, the need for additional procedures, failure to diagnose a condition, and creating a complication requiring transfusion or operation were discussed with the patient. The patient concurred with the proposed plan, giving informed consent.  The site of surgery was properly noted/marked if necessary per policy. The patient has been actively warmed in preoperative area. Preoperative antibiotics are not indicated. Venous thrombosis prophylaxis have been ordered including bilateral sequential compression devices    Procedure Details:     After review of informed consent, patient was taken to the operating room where general anesthetic was administered and found be adequate.  She was prepped and draped in normal sterile fashion in a lithotomy position yellowfin stirrups.  Weighted speculum is placed the posterior fornix of vagina the anterior lip of the cervix was grasped with single-tooth tenaculum.  The uterus was sounded to 7 cm and Cecil dilators then used to dilate the cervix.  This allowed passage of a IMAN uterine manipulator into the uterus and the tip was inflated.    Attention was then turned to the abdomen where a 5 mm incision was made after injecting the infraumbilical fold with local.  A Veress needle was then passed and pneumoperitoneum was then established.  A 5 mm trocar was advanced under direct visualization.  A 5 mm trocar was placed in the left lower quadrant an 8 mm trocar was placed in the suprapubic location.  A blunt probe was then used to elevate the adnexa and fallopian tubes on the pelvis so that we could follow them out to the fimbriated end.  Using a power seal device, the salpingectomy was  performed first on the left than on the right.  Both tubes were retrieved for pathology.  There is no bleeding from the broad ligament.  Pneumoperitoneum was then released and the instruments removed from the abdomen.  The skin incisions were then closed using 4-0 Vicryl in a subcuticular manner.  And the uterine manipulator was removed from the cervix.  There is no bleeding from tenaculum site.  Sponge lap needle counts correct x 2, patient was taken to recovery stable condition.      Complications:  None; patient tolerated the procedure well.    Disposition: PACU - hemodynamically stable.  Condition: stable         Additional Details:     Attending Attestation: I performed the procedure.    Jaclyn Reese  Phone Number: [phone]

## 2023-12-22 LAB
LABORATORY COMMENT REPORT: NORMAL
PATH REPORT.FINAL DX SPEC: NORMAL
PATH REPORT.GROSS SPEC: NORMAL
PATH REPORT.RELEVANT HX SPEC: NORMAL
PATH REPORT.TOTAL CANCER: NORMAL

## 2023-12-29 ENCOUNTER — TELEPHONE (OUTPATIENT)
Dept: OBSTETRICS AND GYNECOLOGY | Facility: CLINIC | Age: 33
End: 2023-12-29
Payer: COMMERCIAL

## 2023-12-29 NOTE — TELEPHONE ENCOUNTER
Pt. Called to scheduled an appointment for surgery follow up, offered January 2, 2024. Pt. Requested Telehealth appointment; mentioned that appointment requires in person. Pt. Hanged up.

## 2023-12-29 NOTE — TELEPHONE ENCOUNTER
She message me and will be scheduling a postop visit.  Please get her in.  She's dealing with work issues.

## 2024-01-02 ENCOUNTER — OFFICE VISIT (OUTPATIENT)
Dept: OBSTETRICS AND GYNECOLOGY | Facility: CLINIC | Age: 34
End: 2024-01-02
Payer: COMMERCIAL

## 2024-01-02 VITALS — BODY MASS INDEX: 23.03 KG/M2 | WEIGHT: 125.9 LBS

## 2024-01-02 DIAGNOSIS — Z48.89 POSTOPERATIVE VISIT: Primary | ICD-10-CM

## 2024-01-02 PROCEDURE — 99024 POSTOP FOLLOW-UP VISIT: CPT | Performed by: OBSTETRICS & GYNECOLOGY

## 2024-01-02 PROCEDURE — 1036F TOBACCO NON-USER: CPT | Performed by: OBSTETRICS & GYNECOLOGY

## 2024-01-02 ASSESSMENT — ENCOUNTER SYMPTOMS
LOSS OF SENSATION IN FEET: 0
OCCASIONAL FEELINGS OF UNSTEADINESS: 0
DEPRESSION: 0

## 2024-01-02 ASSESSMENT — PATIENT HEALTH QUESTIONNAIRE - PHQ9
SUM OF ALL RESPONSES TO PHQ9 QUESTIONS 1 AND 2: 0
2. FEELING DOWN, DEPRESSED OR HOPELESS: NOT AT ALL
1. LITTLE INTEREST OR PLEASURE IN DOING THINGS: NOT AT ALL

## 2024-01-02 ASSESSMENT — PAIN SCALES - GENERAL: PAINLEVEL: 2

## 2024-01-02 NOTE — PROGRESS NOTES
POSTOP VISIT         Patient Name:  Aaliyah Townsend  :  1990  MR #:  [id number]  Acct #:  [id number]      SUBJECTIVE:      Aaliyah Townsend 33 y.o.  presents s/p  laparoscopic bilateral salpingectomy . She is doing well; denies complaints.       OBJECTIVE:   Wt 57.1 kg (125 lb 14.4 oz)   LMP 2023 (Exact Date)   BMI 23.03 kg/m²   Body mass index is 23.03 kg/m².     Physical Exam  General: NAD, AAOx3  HEENT: Normocephalic, without obvious abnormality, atraumatic.   Heart:: regular rate and rhythm, no murmurs appreciated  Lungs: normal work of breathing  Abdomen: soft, non-tender; no masses, no organomegaly. Incision healing well.  Pelvic: deferred   Extremities: full ROM of all extremities   Skin: Skin color, texture, turgor normal. No rashes or lesions      ASSESSMENT/PLAN:             Postoperative visit  PO s/p lap bilateral salpingectomy.  Doing well.  No restrictions    Problem List Items Addressed This Visit       Postoperative visit - Primary     PO s/p lap bilateral salpingectomy.  Doing well.  No restrictions            Follow up for well woman exam.

## 2024-01-03 PROBLEM — Z48.89 POSTOPERATIVE VISIT: Status: ACTIVE | Noted: 2024-01-03

## 2024-01-03 PROBLEM — N83.201 CYST OF RIGHT OVARY: Status: RESOLVED | Noted: 2023-11-21 | Resolved: 2024-01-03

## 2024-05-24 ENCOUNTER — APPOINTMENT (OUTPATIENT)
Dept: PRIMARY CARE | Facility: CLINIC | Age: 34
End: 2024-05-24
Payer: COMMERCIAL

## 2024-06-03 ASSESSMENT — ENCOUNTER SYMPTOMS
HEMATURIA: 0
ANOREXIA: 0
BACK PAIN: 1
HEADACHES: 0
CONSTIPATION: 0
ABDOMINAL PAIN: 0
FLANK PAIN: 1
DIARRHEA: 0
VOMITING: 0
FREQUENCY: 0
CHILLS: 0
SORE THROAT: 0
NAUSEA: 0
FEVER: 0
DYSURIA: 1

## 2024-06-04 ENCOUNTER — OFFICE VISIT (OUTPATIENT)
Dept: PRIMARY CARE | Facility: CLINIC | Age: 34
End: 2024-06-04
Payer: COMMERCIAL

## 2024-06-04 VITALS
SYSTOLIC BLOOD PRESSURE: 122 MMHG | WEIGHT: 119 LBS | HEART RATE: 84 BPM | DIASTOLIC BLOOD PRESSURE: 74 MMHG | OXYGEN SATURATION: 99 % | TEMPERATURE: 98 F | BODY MASS INDEX: 21.77 KG/M2

## 2024-06-04 DIAGNOSIS — B37.31 YEAST VAGINITIS: Primary | ICD-10-CM

## 2024-06-04 PROCEDURE — 99213 OFFICE O/P EST LOW 20 MIN: CPT | Performed by: FAMILY MEDICINE

## 2024-06-04 PROCEDURE — 1036F TOBACCO NON-USER: CPT | Performed by: FAMILY MEDICINE

## 2024-06-04 PROCEDURE — 87205 SMEAR GRAM STAIN: CPT | Performed by: FAMILY MEDICINE

## 2024-06-04 RX ORDER — ASPIRIN 325 MG
1 TABLET, DELAYED RELEASE (ENTERIC COATED) ORAL EVERY EVENING
Qty: 45 G | Refills: 0 | Status: SHIPPED | OUTPATIENT
Start: 2024-06-04 | End: 2024-06-11

## 2024-06-04 RX ORDER — FLUCONAZOLE 150 MG/1
150 TABLET ORAL ONCE
Qty: 1 TABLET | Refills: 0 | Status: SHIPPED | OUTPATIENT
Start: 2024-06-04 | End: 2024-06-04

## 2024-06-04 ASSESSMENT — PAIN SCALES - GENERAL: PAINLEVEL: 0-NO PAIN

## 2024-06-04 NOTE — PROGRESS NOTES
"This is a 33 year old female for EVAL of 'painful' YEAST OR BV  infection as well as \"OTHER\" concerns.  She developed vaginitis due to dental visit.    She WAS ON ANTIBIOTICS RECENTLY AND FEELS NO STD EXPOSURE SO DEFERS STD LABS UNLESS THIS DOES NOT RESOLVE WITH DIFLUCAN         ROS is NEG for HEADACHE, NAUSEA, VOMITING, DIARRHEA, CHEST PAIN, SOB, and BLEEDING and as further REVIEWED BELOW.    Subjective   Aaliyah Townsend \"Dilip\" is a 33 y.o. female who presents for Vaginitis/Bacterial Vaginosis (Yeast infection since 4/5 - trying to treat it herself but needs assistance.) and Follow-up (Moles that she has questions about - some causing pain. Paternal grandmother had skin cancer).    HPI:    Per nursing intake, pt here for Vaginitis/Bacterial Vaginosis (Yeast infection since 4/5 - trying to treat it herself but needs assistance.) and Follow-up (Moles that she has questions about - some causing pain. Paternal grandmother had skin cancer)       Review of systems is essentially negative for all systems except for any identified issues in HPI above.    Objective     /74   Pulse 84   Temp 36.7 °C (98 °F) (Temporal)   Wt 54 kg (119 lb)   SpO2 99%   BMI 21.77 kg/m²      Physical Examination:       GENERAL           General Appearance: well-appearing, well-developed, well-hydrated, well-nourished, no acute distress.        HEENT           NECK supple, no masses or thyromegaly, no carotid bruit.        EYES           Extraocular Movements: normal, bilateral eyes MELINDA, no conjunctival injection.        HEART           Rate and Rhythm regular rate and rhythm. Heart sounds: normal S1S2, no S3 or S4. Murmurs: none.        CHEST           Breath sounds: Clear to IPPA, RR<16 no use of accessory muscles.        ABDOMEN           General: Neg for LKKS or masses, no scleral icterus or jaundice.       Vag PANEL TAKEN BY SELF SWAB       MUSCULOSKELETAL           Joints Demonstration: Neg for erythema, swelling or joint " deformities. gross abnormalities no gross abnormalities.        EXTREMITIES           Lower Extremities: Neg for cyanosis, clubbing or edema.       Assessment/Plan   Problem List Items Addressed This Visit    None  Visit Diagnoses       Yeast vaginitis    -  Primary    Relevant Orders    Vaginitis Gram Stain For Bacterial Vaginosis + Yeast            FOLLOW UP:  PRN and as specified above         Aury Robles M.D.

## 2024-06-06 LAB
CLUE CELLS VAG LPF-#/AREA: NORMAL /[LPF]
NUGENT SCORE: 1
YEAST VAG WET PREP-#/AREA: NORMAL

## 2024-06-07 ENCOUNTER — APPOINTMENT (OUTPATIENT)
Dept: PRIMARY CARE | Facility: CLINIC | Age: 34
End: 2024-06-07
Payer: COMMERCIAL

## 2024-06-13 ENCOUNTER — HOSPITAL ENCOUNTER (EMERGENCY)
Facility: HOSPITAL | Age: 34
Discharge: HOME | End: 2024-06-13
Attending: STUDENT IN AN ORGANIZED HEALTH CARE EDUCATION/TRAINING PROGRAM
Payer: COMMERCIAL

## 2024-06-13 ENCOUNTER — APPOINTMENT (OUTPATIENT)
Dept: CARDIOLOGY | Facility: HOSPITAL | Age: 34
End: 2024-06-13
Payer: COMMERCIAL

## 2024-06-13 VITALS
HEIGHT: 63 IN | BODY MASS INDEX: 21.26 KG/M2 | DIASTOLIC BLOOD PRESSURE: 88 MMHG | TEMPERATURE: 98.2 F | WEIGHT: 120 LBS | RESPIRATION RATE: 16 BRPM | SYSTOLIC BLOOD PRESSURE: 124 MMHG | OXYGEN SATURATION: 100 % | HEART RATE: 82 BPM

## 2024-06-13 DIAGNOSIS — R55 SYNCOPE AND COLLAPSE: Primary | ICD-10-CM

## 2024-06-13 LAB
ALBUMIN SERPL BCP-MCNC: 4.4 G/DL (ref 3.4–5)
ALP SERPL-CCNC: 78 U/L (ref 33–110)
ALT SERPL W P-5'-P-CCNC: 17 U/L (ref 7–45)
ANION GAP SERPL CALC-SCNC: 12 MMOL/L (ref 10–20)
AST SERPL W P-5'-P-CCNC: 15 U/L (ref 9–39)
B-HCG SERPL-ACNC: <2 MIU/ML
BASOPHILS # BLD AUTO: 0.02 X10*3/UL (ref 0–0.1)
BASOPHILS NFR BLD AUTO: 0.3 %
BILIRUB SERPL-MCNC: 0.5 MG/DL (ref 0–1.2)
BUN SERPL-MCNC: 13 MG/DL (ref 6–23)
CALCIUM SERPL-MCNC: 9.3 MG/DL (ref 8.6–10.3)
CARDIAC TROPONIN I PNL SERPL HS: <3 NG/L (ref 0–13)
CHLORIDE SERPL-SCNC: 103 MMOL/L (ref 98–107)
CO2 SERPL-SCNC: 26 MMOL/L (ref 21–32)
CREAT SERPL-MCNC: 0.74 MG/DL (ref 0.5–1.05)
EGFRCR SERPLBLD CKD-EPI 2021: >90 ML/MIN/1.73M*2
EOSINOPHIL # BLD AUTO: 0.02 X10*3/UL (ref 0–0.7)
EOSINOPHIL NFR BLD AUTO: 0.3 %
ERYTHROCYTE [DISTWIDTH] IN BLOOD BY AUTOMATED COUNT: 12.8 % (ref 11.5–14.5)
GLUCOSE SERPL-MCNC: 94 MG/DL (ref 74–99)
HCT VFR BLD AUTO: 39.2 % (ref 36–46)
HGB BLD-MCNC: 12.8 G/DL (ref 12–16)
IMM GRANULOCYTES # BLD AUTO: 0.03 X10*3/UL (ref 0–0.7)
IMM GRANULOCYTES NFR BLD AUTO: 0.4 % (ref 0–0.9)
LYMPHOCYTES # BLD AUTO: 1.74 X10*3/UL (ref 1.2–4.8)
LYMPHOCYTES NFR BLD AUTO: 23.9 %
MAGNESIUM SERPL-MCNC: 2.1 MG/DL (ref 1.6–2.4)
MCH RBC QN AUTO: 29.3 PG (ref 26–34)
MCHC RBC AUTO-ENTMCNC: 32.7 G/DL (ref 32–36)
MCV RBC AUTO: 90 FL (ref 80–100)
MONOCYTES # BLD AUTO: 0.5 X10*3/UL (ref 0.1–1)
MONOCYTES NFR BLD AUTO: 6.9 %
NEUTROPHILS # BLD AUTO: 4.96 X10*3/UL (ref 1.2–7.7)
NEUTROPHILS NFR BLD AUTO: 68.2 %
NRBC BLD-RTO: 0 /100 WBCS (ref 0–0)
PLATELET # BLD AUTO: 272 X10*3/UL (ref 150–450)
POTASSIUM SERPL-SCNC: 3.8 MMOL/L (ref 3.5–5.3)
PROT SERPL-MCNC: 7.5 G/DL (ref 6.4–8.2)
RBC # BLD AUTO: 4.37 X10*6/UL (ref 4–5.2)
SODIUM SERPL-SCNC: 137 MMOL/L (ref 136–145)
WBC # BLD AUTO: 7.3 X10*3/UL (ref 4.4–11.3)

## 2024-06-13 PROCEDURE — 85025 COMPLETE CBC W/AUTO DIFF WBC: CPT | Performed by: STUDENT IN AN ORGANIZED HEALTH CARE EDUCATION/TRAINING PROGRAM

## 2024-06-13 PROCEDURE — 2500000004 HC RX 250 GENERAL PHARMACY W/ HCPCS (ALT 636 FOR OP/ED): Performed by: STUDENT IN AN ORGANIZED HEALTH CARE EDUCATION/TRAINING PROGRAM

## 2024-06-13 PROCEDURE — 96361 HYDRATE IV INFUSION ADD-ON: CPT

## 2024-06-13 PROCEDURE — 2500000001 HC RX 250 WO HCPCS SELF ADMINISTERED DRUGS (ALT 637 FOR MEDICARE OP): Performed by: STUDENT IN AN ORGANIZED HEALTH CARE EDUCATION/TRAINING PROGRAM

## 2024-06-13 PROCEDURE — 99283 EMERGENCY DEPT VISIT LOW MDM: CPT | Mod: 25

## 2024-06-13 PROCEDURE — 83735 ASSAY OF MAGNESIUM: CPT | Performed by: STUDENT IN AN ORGANIZED HEALTH CARE EDUCATION/TRAINING PROGRAM

## 2024-06-13 PROCEDURE — 96360 HYDRATION IV INFUSION INIT: CPT

## 2024-06-13 PROCEDURE — 36415 COLL VENOUS BLD VENIPUNCTURE: CPT | Performed by: STUDENT IN AN ORGANIZED HEALTH CARE EDUCATION/TRAINING PROGRAM

## 2024-06-13 PROCEDURE — 84702 CHORIONIC GONADOTROPIN TEST: CPT | Performed by: STUDENT IN AN ORGANIZED HEALTH CARE EDUCATION/TRAINING PROGRAM

## 2024-06-13 PROCEDURE — 84484 ASSAY OF TROPONIN QUANT: CPT | Performed by: STUDENT IN AN ORGANIZED HEALTH CARE EDUCATION/TRAINING PROGRAM

## 2024-06-13 PROCEDURE — 80053 COMPREHEN METABOLIC PANEL: CPT | Performed by: STUDENT IN AN ORGANIZED HEALTH CARE EDUCATION/TRAINING PROGRAM

## 2024-06-13 PROCEDURE — 93005 ELECTROCARDIOGRAM TRACING: CPT

## 2024-06-13 RX ORDER — MECLIZINE HYDROCHLORIDE 25 MG/1
25 TABLET ORAL 3 TIMES DAILY PRN
Qty: 30 TABLET | Refills: 0 | Status: SHIPPED | OUTPATIENT
Start: 2024-06-13 | End: 2024-06-23

## 2024-06-13 RX ORDER — MECLIZINE HYDROCHLORIDE 25 MG/1
25 TABLET ORAL ONCE
Status: COMPLETED | OUTPATIENT
Start: 2024-06-13 | End: 2024-06-13

## 2024-06-13 RX ADMIN — MECLIZINE HYDROCHLORIDE 25 MG: 25 TABLET ORAL at 13:38

## 2024-06-13 RX ADMIN — SODIUM CHLORIDE 1000 ML: 9 INJECTION, SOLUTION INTRAVENOUS at 11:56

## 2024-06-13 ASSESSMENT — COLUMBIA-SUICIDE SEVERITY RATING SCALE - C-SSRS
6. HAVE YOU EVER DONE ANYTHING, STARTED TO DO ANYTHING, OR PREPARED TO DO ANYTHING TO END YOUR LIFE?: NO
1. IN THE PAST MONTH, HAVE YOU WISHED YOU WERE DEAD OR WISHED YOU COULD GO TO SLEEP AND NOT WAKE UP?: NO
2. HAVE YOU ACTUALLY HAD ANY THOUGHTS OF KILLING YOURSELF?: NO

## 2024-06-13 ASSESSMENT — PAIN - FUNCTIONAL ASSESSMENT: PAIN_FUNCTIONAL_ASSESSMENT: 0-10

## 2024-06-13 ASSESSMENT — PAIN SCALES - GENERAL
PAINLEVEL_OUTOF10: 0 - NO PAIN
PAINLEVEL_OUTOF10: 0 - NO PAIN

## 2024-06-13 NOTE — ED TRIAGE NOTES
"C/O DIZZINESS, PT STATES SHE FELL TIMES WITHIN THE LAST 5 DAYS, DENIES CP/SOB/PALPITATIONS/N/V/DF/F/CHILLS, PT STATES \"WHEN I GET THESE DIZZY SPELLS I GET REALLY HOT AND LOSE MY VISION\" ONSET 6 DAYS, HX CHRONIC NECK PAIN, PT DENIES CHANCE OF PREGNANCY HAD TUBES REMOVED IN DEC 2023, -SMOKER, OCCASIONAL THC USE, DENIES CARDIAC HX   "

## 2024-06-13 NOTE — DISCHARGE INSTRUCTIONS
You have been seen at a [facility].  Please follow-up with your primary care provider in the next 1 to 2 days for further evaluation and routine follow-up.  Please return to the emergency room if having any worsening symptoms.  Please follow-up with any specialists if discussed during your emergency room stay.

## 2024-06-13 NOTE — ED PROVIDER NOTES
EMERGENCY MEDICINE EVALUATION NOTE    History of Present Illness     Chief Complaint:   Chief Complaint   Patient presents with    Dizziness       HPI: Aaliyah Townsend is a 33 y.o. female with past medical history of chronic back pain and anxiety who presents with complaint of syncope.  Patient states 6 days prior to arrival she was at a [employer] festival when she suddenly stood up and then felt warmth and flushing sensation.  She states she did shortly syncopize after that for a very short duration of several seconds.  She states she was caught by her  and came to shortly after.  She states an additional episode yesterday which concerned her although states she was urinated in the bathroom and stood up suddenly when she had a short episode of syncope again.  She denies any prior similar episodes.  She does admit some generalized weakness.  Denies any trouble with ambulation and states she is back to her baseline.  She does admit some preceding chest pain prior to the episode at Religious.  She denies any fever, chills, current pregnancy abdominal pain, nausea, vomiting.  Denies anticoagulant usage or known head trauma.    Previous History     Past Medical History:   Diagnosis Date    Anxiety 2019    Treating with talk therapy/ CBT    Chronic headaches     Chronic pain disorder     Fibromyalgia, primary 2021    Not sure this is a correct diagnosis, I think it might just be neck pain and  low iron/easily bruise    Fractures , ,     All healed, arm, foot, ankle    Joint pain     Neck , shoulder, hip and knee pain on right side    PONV (postoperative nausea and vomiting) 2014    After egg retrieval surgery     Past Surgical History:   Procedure Laterality Date    INDUCED       NASAL SEPTUM SURGERY  2020    ORTHOPEDIC SURGERY Left     Arm fracture repair    OTHER SURGICAL HISTORY  2014    History Of Prior Surgery    OTHER SURGICAL HISTORY      egg donation x3     SALPINGECTOMY Bilateral      Social History     Tobacco Use    Smoking status: Never    Smokeless tobacco: Never   Vaping Use    Vaping status: Never Used   Substance Use Topics    Alcohol use: Not Currently    Drug use: Yes     Types: Marijuana     Comment: With Medical Marijuana card, USAGE IS RARE     Family History   Problem Relation Name Age of Onset    Diabetes Mother Sandra Perez     Depression Mother Sandra Perez     Drug abuse Mother Sandra Perez     Endometriosis Father's Sister      Diabetes Maternal Grandmother Kiara Garcial     Cancer Maternal Grandfather Kiara Garcial     Autoimmune disease Paternal Grandmother Waleska Garcia     Miscarriages / Stillbirths Paternal Grandmother Waleska Garcia     Drug abuse Brother Tee Garcia     Miscarriages / Stillbirths Sister Yuri Garcia     Miscarriages / Stillbirths Other Bindu Camacho (paternal aunt)      Allergies   Allergen Reactions    Gabapentin Nausea/vomiting    Amoxicillin Rash and Nausea/vomiting    Penicillin Rash and Nausea/vomiting     Current Outpatient Medications   Medication Instructions    acetaminophen (Tylenol) 325 mg tablet Take 2 tablets (650 mg) by mouth every 6 hours if needed for mild pain (1 - 3)    ibuprofen 600 mg tablet Take 1 tablet (600 mg) by mouth every 6 hours if needed for moderate pain (4 - 6)    loratadine (CLARITIN) 5 mg, oral, Daily       Physical Exam     Appearance: Alert, oriented , cooperative,  in acute distress. Well nourished & well hydrated.     Skin: Intact,  dry skin, no lesions, rash, petechiae or purpura.      Eyes: PERRLA, EOMs intact,  Conjunctiva pink with no redness or exudates. Cornea & anterior chamber are clear, Eyelids without lesions. No scleral icterus.      ENT: Hearing grossly intact. External auditory canals patent, tympanic membranes intact with visible landmarks. Nares patent, mucus membranes moist. Dentition without lesions. Pharynx clear, uvula midline.      Neck: Supple, without meningismus. Thyroid not  palpable. Trachea at midline. No lymphadenopathy.     Pulmonary: Clear bilaterally with good chest wall excursion. No rales, rhonchi or wheezing. No accessory muscle use or stridor.     Cardiac: Normal S1, S2 without murmur, rub, gallop or extrasystole. No JVD, Carotids without bruits.     Abdomen: Soft, nontender, active bowel sounds.  No palpable organomegaly.  No rebound or guarding.  No CVA tenderness.     Genitourinary: Exam deferred.     Musculoskeletal: Full range of motion. no pain, edema, or deformity. Pulses full and equal. No cyanosis or clubbing.      Neurological:  Cranial nerves II through XII are grossly intact, finger-nose touch is normal, normal sensation, no weakness, no focal findings identified.     Psychiatric: Appropriate mood and affect.      Results     Labs Reviewed   COMPREHENSIVE METABOLIC PANEL - Normal       Result Value    Glucose 94      Sodium 137      Potassium 3.8      Chloride 103      Bicarbonate 26      Anion Gap 12      Urea Nitrogen 13      Creatinine 0.74      eGFR >90      Calcium 9.3      Albumin 4.4      Alkaline Phosphatase 78      Total Protein 7.5      AST 15      Bilirubin, Total 0.5      ALT 17     MAGNESIUM - Normal    Magnesium 2.10     HUMAN CHORIONIC GONADOTROPIN, SERUM QUANTITATIVE - Normal    HCG, Beta-Quantitative <2      Narrative:      Total HCG measurement is performed using the [employer] Access   Immunoassay which detects intact HCG and free beta HCG subunit.    This test is not indicated for use as a tumor marker.   HCG testing is performed using a different test methodology at [facility] than other [facility]. Direct result comparison   should only be made within the same method.       TROPONIN I, HIGH SENSITIVITY - Normal    Troponin I, High Sensitivity <3      Narrative:     Less than 99th percentile of normal range cutoff-  Female and children under 18 years old <14 ng/L; Male <21 ng/L: Negative  Repeat testing should be  "performed if clinically indicated.     Female and children under 18 years old 14-50 ng/L; Male 21-50 ng/L:  Consistent with possible cardiac damage and possible increased clinical   risk. Serial measurements may help to assess extent of myocardial damage.     >50 ng/L: Consistent with cardiac damage, increased clinical risk and  myocardial infarction. Serial measurements may help assess extent of   myocardial damage.      NOTE: Children less than 1 year old may have higher baseline troponin   levels and results should be interpreted in conjunction with the overall   clinical context.     NOTE: Troponin I testing is performed using a different   testing methodology at [facility] than at other   [facility]. Direct result comparisons should only   be made within the same method.   CBC WITH AUTO DIFFERENTIAL    WBC 7.3      nRBC 0.0      RBC 4.37      Hemoglobin 12.8      Hematocrit 39.2      MCV 90      MCH 29.3      MCHC 32.7      RDW 12.8      Platelets 272      Neutrophils % 68.2      Immature Granulocytes %, Automated 0.4      Lymphocytes % 23.9      Monocytes % 6.9      Eosinophils % 0.3      Basophils % 0.3      Neutrophils Absolute 4.96      Immature Granulocytes Absolute, Automated 0.03      Lymphocytes Absolute 1.74      Monocytes Absolute 0.50      Eosinophils Absolute 0.02      Basophils Absolute 0.02       No orders to display         ED Course & Medical Decision Making     Medications   sodium chloride 0.9 % bolus 1,000 mL (1,000 mL intravenous New Bag 6/13/24 1156)     Diagnoses as of 06/13/24 1237   Syncope and collapse     Heart Rate:  [84-96]   Temperature:  [36.8 °C (98.2 °F)]   Respirations:  [16-19]   BP: (117-123)/(80-95)   Height:  [160 cm (5' 2.99\")]   Weight:  [54.4 kg (120 lb)]   Pulse Ox:  [98 %-99 %]      Aaliyah Townsend is a 33 y.o. female with past medical history of chronic back pain and anxiety who presents with complaint of syncope. Patient was nontoxic, stable. " Ambulatory. Exam as above.  EKG reviewed and did show normal sinus rhythm with a rate of 86 bpm, no signs of acute ischemic change or significant arrhythmia.  Labs reviewed.  No significant anemia, electrolyte abnormality, renal dysfunction, or other acute findings. Troponin normal.   Independently reviewed imaging.   Reviewed external records.   Differential diagnosis considered. Overall presentation is consistent with syncope. Low suspicion for cardiac dysrhythmia, stroke, intracranial bleed, pulmonary embolus, or other serious cardiac or neurogenic cause of syncope.  Patient was treated with 1LNS with improvement in symptoms.  Consideration was given for admission, but the patient was stable for outpatient management.  Provided cardiology referral for outpatient evaluation although most likely related to vasovagal syncope.      Procedures   Procedures    Diagnosis     1. Syncope and collapse        Disposition     DISCHARGE.  The patient is discharged back to their place of residence.  Discharge diagnosis, instructions and plan were discussed and understood. At the time of discharge the patient was comfortable and was in no apparent distress. Patient is aware of diagnostic uncertainty and was notified though testing is negative here, there is a very small chance that pathology may be missed.  The patient understands these risks and the patient /family understood to return immediately to the emergency department if the symptoms worsen or if they have any additional concerns.    FOLLOW UP  Primary care provider in 1-2 days.        ED Prescriptions    None            Salvatore High DO  06/13/24 0643

## 2024-06-14 ENCOUNTER — OFFICE VISIT (OUTPATIENT)
Dept: CARDIOLOGY | Facility: CLINIC | Age: 34
End: 2024-06-14
Payer: COMMERCIAL

## 2024-06-14 VITALS
HEART RATE: 110 BPM | OXYGEN SATURATION: 98 % | DIASTOLIC BLOOD PRESSURE: 79 MMHG | SYSTOLIC BLOOD PRESSURE: 118 MMHG | HEIGHT: 62 IN | WEIGHT: 121.8 LBS | BODY MASS INDEX: 22.41 KG/M2

## 2024-06-14 DIAGNOSIS — R55 VASOVAGAL SYNCOPE: Primary | ICD-10-CM

## 2024-06-14 PROCEDURE — 99204 OFFICE O/P NEW MOD 45 MIN: CPT | Performed by: HOSPITALIST

## 2024-06-14 PROCEDURE — 99214 OFFICE O/P EST MOD 30 MIN: CPT | Performed by: HOSPITALIST

## 2024-06-14 PROCEDURE — 1036F TOBACCO NON-USER: CPT | Performed by: HOSPITALIST

## 2024-06-14 ASSESSMENT — COLUMBIA-SUICIDE SEVERITY RATING SCALE - C-SSRS
1. IN THE PAST MONTH, HAVE YOU WISHED YOU WERE DEAD OR WISHED YOU COULD GO TO SLEEP AND NOT WAKE UP?: NO
2. HAVE YOU ACTUALLY HAD ANY THOUGHTS OF KILLING YOURSELF?: NO
6. HAVE YOU EVER DONE ANYTHING, STARTED TO DO ANYTHING, OR PREPARED TO DO ANYTHING TO END YOUR LIFE?: NO

## 2024-06-14 NOTE — PROGRESS NOTES
"This is a 33 year old female for HOSP FU visit for SYNCOPE and \"all the other things no one will address\" per the intake note      ROS is NEG for HEADACHE, NAUSEA, VOMITING, DIARRHEA, CHEST PAIN, SOB, and BLEEDING and as further REVIEWED BELOW.Subjective   Aaliyah Townsend \"Dilip\" is a 33 y.o. female who presents for No chief complaint on file..    HPI:    Per nursing intake, pt here for No chief complaint on file.       Review of systems is essentially negative for all systems except for any identified issues in HPI above.    Objective     There were no vitals taken for this visit.     Physical Examination:       GENERAL           General Appearance: well-appearing, well-developed, well-hydrated, well-nourished, no acute distress.        HEENT           NECK supple, no masses or thyromegaly, no carotid bruit.        EYES           Extraocular Movements: normal, bilateral eyes MELINDA, no conjunctival injection.        HEART           Rate and Rhythm regular rate and rhythm. Heart sounds: normal S1S2, no S3 or S4. Murmurs: none.        CHEST           Breath sounds: Clear to IPPA, RR<16 no use of accessory muscles.        ABDOMEN           General: Neg for LKKS or masses, no scleral icterus or jaundice.        MUSCULOSKELETAL           Joints Demonstration: Neg for erythema, swelling or joint deformities. gross abnormalities no gross abnormalities.        EXTREMITIES           Lower Extremities: Neg for cyanosis, clubbing or edema.       Assessment/Plan   Problem List Items Addressed This Visit    None      FOLLOW UP:  PRN and as specified above         Aury Robles M.D.   "

## 2024-06-14 NOTE — PROGRESS NOTES
"Myrna Townsend \"Dilip\" is a 33 y.o. female with no significant PMH, who is here for evaluation of syncope.  Patient has been feeling dizzy for several years but most recently experienced 2 episodes of loss of conscious [syncope] and within the last 10 days.  Post episode happened upon standing up and start walking proceeded with feeling lightheaded along with blurry vision, sweatiness, and feeling hot.  No reported seizure-like activity.  Otherwise, patient is active and exercise regular basis without any cardiovascular symptoms.    Orthostatic lying 118/78 with a heart of 90, sitting 112/77 with a heart rate of 85, standing 118/79 with a heart of 110.    Most recent blood work from 6/13/2024 with unremarkable CBC and BMP.    Review of Systems  ROS is negative other than in HPI.      Objective   Physical Exam  General: NAD  HEENT: IEOM, PERRL   Neck: No JVD or carotid bruit  Lungs: CTAB  Heart: RRR, normal S1 and S2, no loud murmurs  Abdomen: Soft, nontender, positive bowel sounds  Extremities: No edema  Neurologic: No FND  Psychiatric: Normal mood and affect    Assessment/Plan   1-syncope:  -See HPI for details, most consistent with vasovagal.  -Orthostatic lying 118/78 with a heart of 90, sitting 112/77 with a heart rate of 85, standing 118/79 with a heart of 110.  -Patient was reassured about the benign nature of her syncope.  -Patient was educated about the importance of avoiding triggers if possible, keeping herself well-hydrated, being liberal with salt intake, use of compression stocking, avoid prolonged standing, and slowly change in position.  -Patient states that she has tried many of the above without success, will refer to Dr. Manjarrez.    Return to clinic as needed.    Pablo Garcia MD     "

## 2024-06-14 NOTE — PATIENT INSTRUCTIONS
For your episodes of lightheadedness and losing consciousness, these seem to be most consistent with vasovagal presyncope which are very benign.     We are going to refer you to  neurologist Dr. Manjarrez for further workup and management.  Please call our office at [phone] if you have any questions or concerns.     ---Trigger avoidance; do your best to take steps to avoid known trigger events. Preventive steps in your case include assuming a protected posture (eg, sitting to avoid prolonged standing). Other steps that may be helpful include consuming small meals and reducing carbohydrates in the diet.      ---Recognizing symptoms and taking action; If the symptoms are mild, then you may perform a physical counterpressure maneuver while moving safely to a seated or supine position, which should terminate the episode. If the symptoms are severe, you should move directly to a supine position. In either case, you should remain in a safe, gravitationally neutral position long enough to be sure that all of the warning symptoms have subsided. Arising too soon may trigger a symptom recurrence.     Counterpressure maneuvers: The rationale for these maneuvers is to reduce venous pooling and thus improve cardiac output. The patient should undertake these maneuvers upon first recognition of premonitory symptoms and then assume a supine position. These maneuvers may abort a syncope episode or at least delay it long enough for the patient to move to a safe protected position. While isometric activity may offset a syncopal response, release of this activity may be associated with precipitous decline in heart rate and blood pressure.     Examples of counterpressure maneuvers include:     Leg-crossing with simultaneous tensing of leg, abdominal, and buttock muscles (very effective).     Handgrip, which consists of maximum  on a rubber ball or similar object (effectiveness is limited by hand strength).     Arm tensing,  which involves gripping one hand with the other while simultaneously abducting both arms (effectiveness is limited by arm strength).     Supine position: Patients should be advised to assume the supine position with legs raised at the onset of severe symptoms or immediately after performing a counterpressure maneuver, whenever feasible. Assuming a seated or squatting position may also prove effective.     ---Volume support:      Increase fluid intake: A reasonable regimen is ingesting 500 mL of fluid upon waking with a daily target of 1.5 to 3 L. The fluid may be a low-carbohydrate electrolyte-containing drink or water. Patients can conserve resources by adding electrolyte powder to water.     Liberalizing salt intake     Compression stockings and binders; Compression undergarments may help support intravascular volume.      Compression vests or abdominal binders that compress the splanchnic may be helpful as the splanchnic bed is a large, highly compliant vascular bed that can sequester large volumes of blood, but use of these garments may be limited by discomfort. They are only infrequently needed.     ---Yoga training: Some studies suggest that yoga training may reduce susceptibility to vasovagal syncope.        Counseling on risk: Patients should be reassured about the benign nature of reflex syncope but, nevertheless, warned about potential for injury due to collapse. While mortality risk is very low, injury and accidents are a concern. The risk of injury is highest in patients who have recurrent syncope, are older adults/frail and at risk of fall-induced injury, or who are active in high-risk settings (eg, commercial vehicle , , individuals working on ladders) and wish to continue these activities. Patients with recurrent episodes may require restriction of activities until therapy is shown to be effective.        Driving restrictions: Although vasovagal syncope generally has a benign prognosis from  a mortality perspective, a frequent concern is the potential for accidents and injury, particularly during certain activities such as driving. Clinicians should be familiar with local driving restrictions and reporting requirements for patients with conditions that could impair safe motor vehicle operation. The applicable local laws and regulations vary widely. Insurance companies may decline accident coverage for individuals who are restricted from driving.

## 2024-06-15 LAB
ATRIAL RATE: 86 BPM
P AXIS: 76 DEGREES
P OFFSET: 211 MS
P ONSET: 155 MS
PR INTERVAL: 138 MS
Q ONSET: 224 MS
QRS COUNT: 15 BEATS
QRS DURATION: 80 MS
QT INTERVAL: 352 MS
QTC CALCULATION(BAZETT): 421 MS
QTC FREDERICIA: 397 MS
R AXIS: 48 DEGREES
T AXIS: 67 DEGREES
T OFFSET: 400 MS
VENTRICULAR RATE: 86 BPM

## 2024-06-21 ENCOUNTER — APPOINTMENT (OUTPATIENT)
Dept: PRIMARY CARE | Facility: CLINIC | Age: 34
End: 2024-06-21
Payer: COMMERCIAL

## 2024-06-21 DIAGNOSIS — Z71.9 HEALTH COUNSELING: Primary | ICD-10-CM

## 2024-06-21 DIAGNOSIS — Z09 HOSPITAL DISCHARGE FOLLOW-UP: ICD-10-CM

## 2024-07-10 ENCOUNTER — APPOINTMENT (OUTPATIENT)
Dept: PRIMARY CARE | Facility: CLINIC | Age: 34
End: 2024-07-10
Payer: COMMERCIAL

## 2024-07-29 NOTE — PROGRESS NOTES
"AUDIOLOGY ADULT AUDIOMETRIC EVALUATION      Name:  Aaliyah Townsend \"Dilip\"   :  1990  Age:  33 y.o.  Date of Evaluation:  2024    Time: 0812-9570    IMPRESSIONS     Hearing sensitivity within normal limits for 125-8000 Hz in both ears.  Word understanding in quiet is excellent bilaterally.  Tympanometry indicates normal middle ear pressure and tympanic membrane mobility in both ears.       RECOMMENDATIONS     Continue medical follow up with primary care provider and/or Ears Nose and Throat (ENT) provider as recommended.  Return for audiologic evaluation should concerns arise. The audiology department can be reached at [phone] to schedule an appointment.   Avoid exposure to loud sounds by moving away from the noise, turning down the volume, or wearing proper hearing protection correctly.    HISTORY     Reason for visit:  Ms. Townsend is seen today for an initial audiologic evaluation in conjunction with an evaluation with Katiuska Lloyd CNP due to dizziness.  History obtained from patient report and chart review.     Tinnitus: denied   Otalgia: denied  Aural Pressure/Fullness: denied  Recent Ear Infections/Illness: denied  Otorrhea: denied  Dizziness: yes, beginning in  when she stood up and lost consciousness. This happened for a couple weeks afterwards but has since improved. She now reports dizziness and spinning mostly in the morning. She noted she has neck pain  History of Ear Surgeries: denied    EVALUATION     See scanned audiogram in \"Media\"     TEST RESULTS     Otoscopic Evaluation:  Right Ear: Ear canal clear, tympanic membrane visualized.  Left Ear: Ear canal clear, tympanic membrane visualized.    Tympanometry (226 Hz):  Right Ear: Type A, middle ear pressure and tympanic membrane compliance within normal limits.   Left Ear: Type A, middle ear pressure and tympanic membrane compliance within normal limits.     Acoustic Reflexes:   Right Ear: (Ipsilateral) Responses present at " expected levels for 500-4000 Hz.  Left Ear: (Ipsilateral) Responses present at expected levels for 500-4000 Hz.    Test technique:  Pure Tone Audiometry via insert earphones  Reliability: Good    Pure Tone Audiometry:    Right Ear: Hearing sensitivity within normal limits for 125-8000 Hz  Left Ear: Hearing sensitivity within normal limits for 125-8000 Hz    Speech Audiometry:   Right Ear:  Speech Reception Threshold (SRT) was obtained at 10 dB HL. Word Recognition scores were excellent (92%) in quiet when words were presented at 50 dB HL. These results are based on [facility] Auditory Test No.6 (NU-6) (N=25).   Left Ear:  Speech Reception Threshold (SRT) was obtained at 10 dB HL. Word Recognition scores were excellent (100%) in quiet when words were presented at 50 dB HL. These results are based on [facility] Auditory Test No.6 (NU-6) (N=25).     Comparison of today's results with previous test results: No previous results available.         PATIENT EDUCATION     Discussed results and recommendations with Ms. Townsend. Questions were addressed and the patient was encouraged to contact our department at [phone] should concerns arise.     LEWIS Luo.  Audiology Graduate Clinician    Sylvia White, CCC-A  Clinical Audiologist    Degree of   Hearing Sensitivity dB Range   Within Normal Limits (WNL) 0 - 20   Slight 25   Mild 26 - 40   Moderate 41 - 55   Moderately-Severe 56 - 70   Severe 71 - 90   Profound 91 +     Key   CHL Conductive Hearing Loss   ECV Ear Canal Volume   HA Hearing Aid   NIHL Noise-Induced Hearing Loss   PTA Pure Tone Average   SNHL Sensorineural Hearing Loss   TM Tympanic Membrane   WNL Within Normal Limits

## 2024-07-30 ENCOUNTER — APPOINTMENT (OUTPATIENT)
Dept: OTOLARYNGOLOGY | Facility: CLINIC | Age: 34
End: 2024-07-30
Payer: COMMERCIAL

## 2024-07-30 ENCOUNTER — CLINICAL SUPPORT (OUTPATIENT)
Dept: AUDIOLOGY | Facility: CLINIC | Age: 34
End: 2024-07-30
Payer: COMMERCIAL

## 2024-07-30 VITALS — BODY MASS INDEX: 21.75 KG/M2 | TEMPERATURE: 97.8 F | WEIGHT: 118.2 LBS | HEIGHT: 62 IN

## 2024-07-30 DIAGNOSIS — R42 DIZZINESS: Primary | ICD-10-CM

## 2024-07-30 DIAGNOSIS — M43.6 NECK STIFFNESS: ICD-10-CM

## 2024-07-30 DIAGNOSIS — M54.2 NECK PAIN: ICD-10-CM

## 2024-07-30 DIAGNOSIS — Z87.898 HISTORY OF LOSS OF CONSCIOUSNESS: ICD-10-CM

## 2024-07-30 DIAGNOSIS — R55 SYNCOPE, UNSPECIFIED SYNCOPE TYPE: ICD-10-CM

## 2024-07-30 DIAGNOSIS — R42 DIZZY SPELLS: ICD-10-CM

## 2024-07-30 DIAGNOSIS — M26.609 TMJ DYSFUNCTION: ICD-10-CM

## 2024-07-30 PROCEDURE — 3008F BODY MASS INDEX DOCD: CPT | Performed by: NURSE PRACTITIONER

## 2024-07-30 PROCEDURE — 92550 TYMPANOMETRY & REFLEX THRESH: CPT | Performed by: AUDIOLOGIST

## 2024-07-30 PROCEDURE — 1036F TOBACCO NON-USER: CPT | Performed by: NURSE PRACTITIONER

## 2024-07-30 PROCEDURE — 99204 OFFICE O/P NEW MOD 45 MIN: CPT | Performed by: NURSE PRACTITIONER

## 2024-07-30 PROCEDURE — 92557 COMPREHENSIVE HEARING TEST: CPT | Performed by: AUDIOLOGIST

## 2024-07-30 NOTE — PROGRESS NOTES
"Subjective   Patient ID: Aaliyah Townsend \"Dilip\" is a 33 y.o. female who presents for New Patient Visit.  HPI  This patient is referred for evaluation of  episodic non vertiginous dizziness. The patient is not accompanied by anyone. The approximate duration of her complaints is one month.    The patient describes her dizziness as 2 episodes of syncope.  The first, was that a concert and witnessed by her .  Patient remembers walking over to him then woke up in an ambulance.  She was not taken to the hospital because her vital signs are stable.  The second occurred the following day while at work and was less severe.  She called her PCPs office and was told to go to the ED.  Patient was discharged due to being currently asymptomatic and having normal vital signs.  Since then, she has had no further syncopal episodes but does admit that she has never had very good balance.  She also expresses concern about possible cervicogenic origin of her symptoms.  She has had chronic neck pain and stiffness.  She has done PT multiple times.  She also reports that if she presses on a certain area of her neck she can elicit numbness in her extremities or \"like I am having a seizure.\"  In the past, her insurance has denied imaging.  When asked about ear pain, headache, phono-photophobia, visual or motion intolerance, sound or pressure induced symptoms, hearing loss, discharge from ear, tinnitus, aural fullness or autophony, the patient admits to headaches (= hx of migraine), significant phono sensitivity and photosensitivity with migraine only, new onset of motion intolerance, possible pressure induced dizziness, and autophony 8 when wearing headphones only.  When asked about a significant past otological history including history of prior ear surgery, noise exposure, exposure to ototoxic drugs or agents, and/or family history of hearing loss, the patient admits to grand father with presbycusis.    Review of Systems  A " comprehensive or 10 points review of the patient´s constitutional, neurological, HEENT, pulmonary, cardiovascular and genito-urinary systems showed only those mentioned in history of present illness.    Objective   Physical Exam  Constitutional: no fever, chills, weight loss or weight gain   General appearance: Appears well, well-nourished, well groomed. No acute distress.   Communication: Normal communication   Psychiatric: Oriented to person, place and time. Normal mood and affect.   Neurologic: Cranial nerves II-XII grossly intact and symmetric bilaterally.   Head and Face:   Head: Atraumatic with no masses, lesions or scarring.   Face: Normal symmetry, no paralysis, synkinesis or facial tic. No scars or deformities.   TMJ: Significant bilateral crepitus  Eyes: Conjunctiva not edematous or erythematous   Ears: External inspection of ears with no deformity, scars or masses. Bilateral EACs clear and bilateral TMs intact with no signs of effusions   Nose: External inspection of nose: No nasal lesions, lacerations or scars.   Neck: Normal appearing, symmetric, trachea midline.   Cardiovascular: Examination of peripheral vascular system shows no clubbing or cyanosis.   Respiratory: No respiratory distress increased work of breathing. Inspection of the chest with symmetric chest expansion and normal respiratory effort.   Skin: No rashes in the head or neck  Bedside occulomotor function assessment for ocular pursuits and saccades, spontaneous nystagmus,  positional and postural testing (Romberg, Fukuta, head thrust, tandem gait) is normal.  My interpretation of the audiogram done today is normal hearing with excellent word recognition scores and normal tympanograms.  However, hearing thresholds at 1000 Hz and 4000 Hz are at 0 DB.     Assessment/Plan        This patient presents for initial evaluation of acute acquired syncope as well as chronic acquired dizziness, neck pain, neck stiffness and TMJ  dysfunction.    Reassurance given that otologic exam and balance function testing today are both normal.  Audiogram was reviewed in detail and is essentially normal, but hearing thresholds at 0 DB are concerning for possible semicircular canal dehiscence.  The physiology of balance control was explained. The likely possible etiologies were reviewed.  We discussed that her 2 syncopal episodes would not be caused by a peripheral vestibular disorder, but her audiogram and her significant sensitivity to loud sound is concerning for semicircular canal dehiscence.  Therefore, I recommended balance function testing.  We also discussed that pressure applied to the neck causing significant symptoms is very unusual.  I recommended a referral to neurosurgery.  Lastly, I recommended referral to an outside physical therapy practice that does specialize in TMJ dysfunction and cervicogenic issues.  Patient is in agreement with the plan.  I am happy to discuss balance testing results over the phone.  All questions were answered to patient's satisfaction.      45 minutes was spent on this patient´s visit. More than 50% of that time was spent in counseling regarding the possible etiologies, test results, treatment options and coordinating care.    This note was created using speech recognition transcription software. Despite proofreading, several typographical errors might be present that might affect the meaning of the content. Please call with any questions.      MONSERRAT Morel-CNP 07/30/24 11:19 AM

## 2024-08-12 ENCOUNTER — APPOINTMENT (OUTPATIENT)
Dept: NEUROSURGERY | Facility: CLINIC | Age: 34
End: 2024-08-12
Payer: COMMERCIAL

## 2024-08-13 ENCOUNTER — APPOINTMENT (OUTPATIENT)
Dept: NEUROSURGERY | Facility: HOSPITAL | Age: 34
End: 2024-08-13
Payer: COMMERCIAL

## 2024-08-16 NOTE — PROGRESS NOTES
"    ADULT BALANCE FUNCTION TEST (BFT)    Name:  Aaliyah Townsend \"Dilip\"  :  1990  Age:  33 y.o.  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    There were no further indications of active vestibular system involvement to account for patient reported symptoms. There were no indications of active Benign Paroxysmal Positional Vertigo (BPPV) present. Normal observation of gait and transfers. Patient's risk of falling based on today's evaluation is low.     RECOMMENDATIONS     Consider further investigation into SCD as a source for patient reported symptoms given today's findings, specifically CT scan to confirm.   Given reported history, continue with scheduled multi-specialty evaluations given symptoms are likely multi-factorial in nature.  Consider re-evaluation as medically indicated.  Maintain a healthy lifestyle to help body function overall.  Continue monitoring per ENT/PCP preference.    Time: 1340-3089    HISTORY     Patient was seen for Balance Function Testing (BFT) due to a history of dizziness/imbalance. Vestibular case history collected via patient-clinician interview, patient chart review, and patient questionnaires.    Patient reported history of dizziness described as episodic syncope, however notes years of intermittent imbalance and lightheadedness since childhood.  Symptoms began 2024. She has had 2-3 syncopal episodes since initial onset.  Episodes lasted up to 40 minutes before subsiding.  Most recent episode occurred 2024.  Associated symptoms include intermittent imbalance, nausea/emesis, disorientation, and lightheadedness.  Symptoms are provoked by possible standing, however patient is unsure.  Patient is unsure " "if symptoms can be alleviated, however has attempted to up her salt intake, take slower movements day to day, and wear compression socks.  Overall the patient's symptoms have remained the same over time.  This patient has had a total of 3 falls due to their symptoms (relation to syncope - not imbalance).   Denied any symptoms provoked by sneezing or coughing.   Relevant medical history includes motion sickness, headaches, significant phono sensitivity and photosensitivity with migraine only, new onset of motion intolerance, possible pressure induced dizziness, and autophony when wearing headphones only.  Most recent audiologic evaluation performed on 07/30/2024 by GALLO Luo under supervision of Sylvia White CCC-A revealed normal hearing sensitivity, bilaterally. Tympanometry revealed normal eardrum mobility and canal volume, bilaterally.  Most recent vertigo evaluation performed on 07/30/2024 by Katiuska Lloyd CNP revealed bedside occulomotor function assessment for ocular pursuits and saccades, spontaneous nystagmus, positional and postural testing (Romberg, Fukuta, head thrust, tandem gait) as normal.   Patient reported complying with pre-test instructions.      EVALUATION     See VNG, vHIT, & VEMP Raw Data in \"Media\"    TEST RESULTS     BEDSIDE ASSESSMENT TEST  The bedside assessment is an optional portion of the test battery to further assist in differential diagnosis and screen for eye abnormalities which may affect testing.    Otoscopy: clear ear canals.  Extra-Ocular Range of Motion: normal.Extra-Ocular Range of Motion is performed to evaluate any eye abnormalities prior to testing.  Cover/Uncover: normal. Cover/Uncover test is performed to evaluate for skewed deviation of the eyes prior to testing.  Cervical Neck Exam: normal. Cervical Neck is performed to evaluate any restrictions or pain with neck movement prior to testing.  Vertebral Artery Screen: normal. Vertebral Artery Screen is " performed to evaluate for vertebrobasilar insufficiency prior to testing.   Ocular Counter-Roll: normal. Ocular Counter-Roll is performed to evaluate ocular tilt reaction and assess otolith organ function prior to testing.  VOR Cancellation: normal. VOR Cancellation is performed to evaluate fixation suppression prior to testing.  Modified Clinical Test of Sensory Interaction on Balance (mCTSIB): normal. mCTSIB is performed to evaluate balance with varying sensory information.      VIDEONYSTAGMOGRAPHY (VNG) TEST  VNG provides objective indications of peripheral and central vestibulo-ocular pathway involvement. Ocular motor testing to visually guided targets is conducted using a dual channel video-recording technique for the recording of eye movement in the horizontal and vertical planes. Air caloric testing is performed at 48 degrees C and 24 degrees C.    Spontaneous Nystagmus test was absent. Spontaneous nystagmus testing may help with the identification of an acute or uncompensated peripheral vestibular lesion.   Gaze Nystagmus test was normal. Gaze nystagmus testing is to evaluate for nystagmus that is evoked by holding eye gaze in any particular direction. True gaze nystagmus is amplified when vision is denied.   Random Saccades test was normal. Random saccade testing is to evaluate patient's ability to make fast random eye movements along a horizontal moving target.   Smooth Pursuit/Tracking test was borderline normal. Test repeated for best performance. Smooth pursuit/tracking testing is to evaluate the ability to move eyes with a single smoothly moving target.   Optokinetic nystagmus testing was normal. This full-field OPK test is to evaluate the ability of central nervous system to stabilize vision during sustained head movement after the VOR system loses effectiveness.   Moody-Hallpike testing was normal. Moody-Hallpike testing is to provide a diagnosis of Benign Paroxysmal Positional Vertigo (BPPV) of the  vertical semicircular canals on the side which is most affected.  Roll testing was normal. Roll testing is to provide a diagnosis of Benign Paroxysmal Positional Vertigo (BPPV) of the horizontal semicircular canals on the side which is most affected.  Positional testing was normal. Positional testing is to evaluate patient's ability to hold a steady gaze while in different positions.  Bithermal caloric testing was normal. Unilateral weakness of 20% to the right which is normal and a directional preponderance of 16% to the left which is normal. Caloric testing is to evaluate for peripheral vestibular lesion.      VIDEO HEAD IMPULSE TEST (vHIT)  The vHIT procedure provides objective assessment of the high frequency vestibulo-ocular reflex (VOR) for each semicircular canal. Rapid, random horizontal and vertical thrusts are applied to the patient's head to provoke the VOR. The vHIT procedure includes two separate paradigms: Head Impulse Paradigm (HIMP) and Suppression Head Impulse Paradigm (SHIMP). SHIMP is an optional paradigm that is not appropriate to perform for every patient. However, it is appropriate to perform SHIMP when there is verified evidence of possible vestibulopathy in the traditional HIMP test.     Head Impulse Paradigm (HIMP)   Right Ear   Canal Gain Overt Saccades Covert Saccades   Lateral 0.95 absent absent   Anterior 1.07 absent absent   Posterior 1.15 absent absent        Head Impulse Paradigm (HIMP)   Left Ear   Canal Gain Overt Saccades Covert Saccades   Lateral 1.05 absent absent   Anterior 1.20 absent absent   Posterior 1.04 absent absent     Total gain for all canals tested was within normal limits  (<0.80 is abnormal for lateral, <0.70 is abnormal for vertical).  There was no evidence of overt or covert saccades throughout testing.      CERVICAL VESTIBULAR EVOKED MYOGENIC POTENTIALS (cVEMP)  The cVEMP procedure is an evoked potential used to test the saccule and its afferent pathway. An  asymmetry ratio is utilized to determine side of lesion. The cVEMP was recorded with the patient cervical extension to produce isolated contraction of the ipsilateral sternocleidomastoid (SCM) muscle. The cVEMP was recorded using a 500 Hz tone burst or 1000 Hz tone burst at a rate of 5.1.      Ear Presentation Level Amplitude P1 Latency N1 Latency  Amplitude Asymmetry Ratio   Right 100 dB nHL 41.97 µV 14.67 ms 23.67 ms Could not calculate due to threshold difference.   Left 95 dB nHL 101.5 µV 16.67 ms 23.67 ms       Superior Canal Dehiscence Screening (75 dB nHL): Negative in the right ear and positive in the left ear    Replicable cVEMP responses were within normal limits, bilaterally. The amplitude asymmetry ratio was unable to be calculated.        OCULAR VESTIBULAR EVOKED MYOGENIC POTENTIALS (oVEMP)  The oVEMP procedure is an evoked potential used to test the utricle and its afferent pathway. An asymmetry ratio is utilized to determine side of lesion. This is a contralateral recording. The oVEMP was recorded with the patient seated upright with eyes tilted upward to produce isolated contraction of the contralateral inferior oblique muscle. The oVEMP were recorded using a 500 Hz, 2000 Hz, 4000 Hz tone burst at a rate of 5.1.       Ear Presentation Level Amplitude N1 Latency  P1 Latency  Amplitude Asymmetry Ratio   Right 95 dB nHL 3.80 µV 9.67 ms 12.33 ms 27%   Left 95 dB nHL 6.55 µV 11.00 ms 13.33 ms       Meniere's Disease Screening (2000 Hz): Negative bilaterally  Superior Canal Dehiscence Screening (4000 Hz): Negative in the right ear and positive in the left ear    Replicable oVEMP responses were within normal limits, bilaterally.  The amplitude asymmetry ratio of 27% was not significant (>33% = abnormal).      Raw data reviewed by a member of the Vestibular & Balance Disorders team. Testing and interpretation of results completed by VIRGILIO Angeles, CCC-A. It was my pleasure to evaluate this patient.        Sylvia Angeles, CCC-A [facility]  Senior Clinical Vestibular Audiologist

## 2024-08-16 NOTE — PROGRESS NOTES
"    ADULT BALANCE FUNCTION TEST (BFT)      Name:  Aaliyah Townsend \"Dilip\"  :  1990  Age:  33 y.o.  Date of Evaluation:  2024    Patient's VEMP testing seen in conjunction with VNG & vHIT testing. Please refer to same day provider note for further details.      VIRGILIO Angeles, CCC-A [facility]  Senior Clinical Vestibular Audiologist    "

## 2024-08-19 NOTE — PROGRESS NOTES
It was a pleasure to see Ms. Townsend at the Neurosurgery Sp[facility] at [facility].     She is a really nice 33 y.o. female  who presents to us with complaints NECK pain RADIATING into the LEFT arm  that started about  5  years  ago, and have been gradually worsening since that time.  The symptoms started after suffering a concussion while on a roller coaster.     The pain is 4 /10. The pain is described as aching and stabbing and occurs all day.  Symptoms are exacerbated by flexion and twisting. Factors which relieve the pain include nothing      Numbness and/or tingling - YES- bilateral legs     Weakness : NO    Bladder/Bowel symptoms - NO    The patient has tried medications (eg: gabapentin, NSAIDS and narcotics ) : Yes- gabapentin  PT : Yes    Date: 2024- currently in third year of PT   Pain Management with ESIs/selective nerve blocks  - NO- has tried Ketamine infusions which were not helpful     she is a NON-SMOKER and NON-DIABETIC    History of Depression : NO    PRIOR SPINE SURGERY: NO    Denies use of Aspirin, Coumadin, or Plavix or any other anticoagulants     NARCOTICS for pain : NO    Part of this patient’s history is from personal review of the patient’s previous charts.      Past Medical History:   Diagnosis Date    Anxiety 09/08/2019    Treating with talk therapy/ CBT    Chronic headaches     Chronic pain disorder     Fibromyalgia, primary 06/30/2021    Not sure this is a correct diagnosis, I think it might just be neck pain and  low iron/easily bruise    Fractures 2001, 2002, 2014    All healed, arm, foot, ankle    Joint pain 2019    Neck , shoulder, hip and knee pain on right side    PONV (postoperative nausea and vomiting) 08/26/2014    After egg retrieval surgery           Current Outpatient Medications:     loratadine (Claritin) 5 mg chewable tablet, Chew 1 tablet (5 mg) once daily., Disp: , Rfl:       Review of Systems :         EXAM:   There were no vitals filed for this  visit.      IMAGING:   ***    ASSESSMENT AND PLAN:  ***        Josh Patricia MD, [facility]   of Neurological Surgery  [facility] [employer]  Attending Surgeon  Director - Minimally Invasive Spine Surgery  [place], [place]      Some of this note was completed using Dragon voice recognition technology and sometimes the software misinterprets words. This may include unintended errors with respect to translation of words, typographical errors or grammar errors which may not have been identified prior to finalization of the chart note. Please take this into account when reading this note

## 2024-08-20 ENCOUNTER — OFFICE VISIT (OUTPATIENT)
Dept: NEUROLOGY | Facility: CLINIC | Age: 34
End: 2024-08-20
Payer: COMMERCIAL

## 2024-08-20 ENCOUNTER — APPOINTMENT (OUTPATIENT)
Dept: NEUROSURGERY | Facility: CLINIC | Age: 34
End: 2024-08-20
Payer: COMMERCIAL

## 2024-08-20 VITALS — BODY MASS INDEX: 22.08 KG/M2 | WEIGHT: 120 LBS | HEIGHT: 62 IN

## 2024-08-20 DIAGNOSIS — G43.709 CHRONIC MIGRAINE WITHOUT AURA WITHOUT STATUS MIGRAINOSUS, NOT INTRACTABLE: ICD-10-CM

## 2024-08-20 DIAGNOSIS — G43.109 MIGRAINE WITH AURA AND WITHOUT STATUS MIGRAINOSUS, NOT INTRACTABLE: ICD-10-CM

## 2024-08-20 DIAGNOSIS — G95.9 MYELOPATHY (MULTI): Primary | ICD-10-CM

## 2024-08-20 DIAGNOSIS — R55 SYNCOPE, UNSPECIFIED SYNCOPE TYPE: ICD-10-CM

## 2024-08-20 PROCEDURE — 1036F TOBACCO NON-USER: CPT | Performed by: STUDENT IN AN ORGANIZED HEALTH CARE EDUCATION/TRAINING PROGRAM

## 2024-08-20 PROCEDURE — 3008F BODY MASS INDEX DOCD: CPT | Performed by: STUDENT IN AN ORGANIZED HEALTH CARE EDUCATION/TRAINING PROGRAM

## 2024-08-20 PROCEDURE — G2211 COMPLEX E/M VISIT ADD ON: HCPCS | Performed by: STUDENT IN AN ORGANIZED HEALTH CARE EDUCATION/TRAINING PROGRAM

## 2024-08-20 PROCEDURE — 99417 PROLNG OP E/M EACH 15 MIN: CPT | Performed by: STUDENT IN AN ORGANIZED HEALTH CARE EDUCATION/TRAINING PROGRAM

## 2024-08-20 PROCEDURE — 99205 OFFICE O/P NEW HI 60 MIN: CPT | Performed by: STUDENT IN AN ORGANIZED HEALTH CARE EDUCATION/TRAINING PROGRAM
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
## 2024-08-20 NOTE — PATIENT INSTRUCTIONS
Call [phone] to schedule your MRI.    Call [phone] to schedule your autonomic test.        Start Nurtec for severe migraine attacks     Start taking magnesium oxide and vitamin B2 supplement 200 to 400 mg up to twice daily for migraine prevention.    Get your blood drawn.     You can reach us at our office phone: [phone].     Navi Tony MD  Neurology and Neuromuscular Medicine   [facility]  [facility] [place]   [address], [address]  [address] [place] [address]         Melolabial Interpolation Flap Text: A decision was made to reconstruct the defect utilizing an interpolation axial flap and a staged reconstruction.  A telfa template was made of the defect.  This telfa template was then used to outline the melolabial interpolation flap.  The donor area for the pedicle flap was then injected with anesthesia.  The flap was excised through the skin and subcutaneous tissue down to the layer of the underlying musculature.  The pedicle flap was carefully excised within this deep plane to maintain its blood supply.  The edges of the donor site were undermined.   The donor site was closed in a primary fashion.  The pedicle was then rotated into position and sutured.  Once the tube was sutured into place, adequate blood supply was confirmed with blanching and refill.  The pedicle was then wrapped with xeroform gauze and dressed appropriately with a telfa and gauze bandage to ensure continued blood supply and protect the attached pedicle.

## 2024-08-21 ENCOUNTER — SPECIALTY PHARMACY (OUTPATIENT)
Dept: PHARMACY | Facility: CLINIC | Age: 34
End: 2024-08-21

## 2024-08-27 ENCOUNTER — CLINICAL SUPPORT (OUTPATIENT)
Dept: AUDIOLOGY | Facility: CLINIC | Age: 34
End: 2024-08-27
Payer: COMMERCIAL

## 2024-08-27 ENCOUNTER — LAB (OUTPATIENT)
Dept: LAB | Facility: LAB | Age: 34
End: 2024-08-27
Payer: COMMERCIAL

## 2024-08-27 DIAGNOSIS — G95.9 MYELOPATHY (MULTI): ICD-10-CM

## 2024-08-27 DIAGNOSIS — H83.8X2 OTHER SPECIFIED DISEASES OF LEFT INNER EAR: Primary | ICD-10-CM

## 2024-08-27 DIAGNOSIS — R42 DIZZINESS: ICD-10-CM

## 2024-08-27 LAB
ALBUMIN SERPL BCP-MCNC: 4.2 G/DL (ref 3.4–5)
ANION GAP SERPL CALC-SCNC: 12 MMOL/L (ref 10–20)
BUN SERPL-MCNC: 11 MG/DL (ref 6–23)
CALCIUM SERPL-MCNC: 9.6 MG/DL (ref 8.6–10.6)
CHLORIDE SERPL-SCNC: 104 MMOL/L (ref 98–107)
CO2 SERPL-SCNC: 28 MMOL/L (ref 21–32)
CREAT SERPL-MCNC: 0.61 MG/DL (ref 0.5–1.05)
EGFRCR SERPLBLD CKD-EPI 2021: >90 ML/MIN/1.73M*2
GLUCOSE SERPL-MCNC: 94 MG/DL (ref 74–99)
PHOSPHATE SERPL-MCNC: 3.2 MG/DL (ref 2.5–4.9)
POTASSIUM SERPL-SCNC: 4.1 MMOL/L (ref 3.5–5.3)
SODIUM SERPL-SCNC: 140 MMOL/L (ref 136–145)

## 2024-08-27 PROCEDURE — 92540 BASIC VESTIBULAR EVALUATION: CPT

## 2024-08-27 PROCEDURE — 92537 CALORIC VSTBLR TEST W/REC: CPT

## 2024-08-27 PROCEDURE — 92519 VEMP TST I&R CERVICAL&OCULAR: CPT

## 2024-08-27 PROCEDURE — 80069 RENAL FUNCTION PANEL: CPT

## 2024-08-27 PROCEDURE — 92700 UNLISTED ORL SERVICE/PX: CPT

## 2024-08-27 PROCEDURE — 36415 COLL VENOUS BLD VENIPUNCTURE: CPT

## 2024-08-27 NOTE — PATIENT INSTRUCTIONS
BALANCE FUNCTION TEST (BFT)  AFTER VISIT SUMMARY      TESTING SUMMARY     The purpose of today's testing was to evaluate for any vestibular system (inner ear) involvement to account for your symptoms of dizziness/imbalance. Deep inside each of your ears, there are 5 balance organs which contribute to your ability to maintain balance and reduce dizziness. Our vestibular system involves 3 semicircular canals (“spinning detectors”) and 2 otolith organs (“gravity sensors”).    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 Further investigation is warranted at this time to confirm any diagnosis.    RECOMMENDATIONS     Continue medical follow up with Katiuska Lloyd CNP.   Consider further investigation into SCD as a source for patient reported symptoms is warranted.   Consider re-evaluation as medically indicated.  Maintain a healthy lifestyle to help body function overall.    Testing and interpretation of results completed by Sylvia Angeles. It was my pleasure to evaluate this patient.       Sylvia Angeles CCMARCEL  Senior Clinical Vestibular Audiologist

## 2024-09-03 ENCOUNTER — HOSPITAL ENCOUNTER (OUTPATIENT)
Dept: RADIOLOGY | Facility: HOSPITAL | Age: 34
Discharge: HOME | End: 2024-09-03
Payer: COMMERCIAL

## 2024-09-03 DIAGNOSIS — R42 DIZZINESS: Primary | ICD-10-CM

## 2024-09-03 DIAGNOSIS — G95.9 MYELOPATHY (MULTI): ICD-10-CM

## 2024-09-03 PROCEDURE — A9575 INJ GADOTERATE MEGLUMI 0.1ML: HCPCS | Performed by: STUDENT IN AN ORGANIZED HEALTH CARE EDUCATION/TRAINING PROGRAM

## 2024-09-03 PROCEDURE — 72156 MRI NECK SPINE W/O & W/DYE: CPT | Performed by: RADIOLOGY

## 2024-09-03 PROCEDURE — 2550000001 HC RX 255 CONTRASTS: Performed by: STUDENT IN AN ORGANIZED HEALTH CARE EDUCATION/TRAINING PROGRAM

## 2024-09-03 PROCEDURE — 72156 MRI NECK SPINE W/O & W/DYE: CPT

## 2024-09-03 RX ORDER — GADOTERATE MEGLUMINE 376.9 MG/ML
10 INJECTION INTRAVENOUS
Status: COMPLETED | OUTPATIENT
Start: 2024-09-03 | End: 2024-09-03

## 2024-09-10 PROCEDURE — RXMED WILLOW AMBULATORY MEDICATION CHARGE

## 2024-09-11 ENCOUNTER — SPECIALTY PHARMACY (OUTPATIENT)
Dept: PHARMACY | Facility: CLINIC | Age: 34
End: 2024-09-11

## 2024-09-12 ENCOUNTER — PHARMACY VISIT (OUTPATIENT)
Dept: PHARMACY | Facility: CLINIC | Age: 34
End: 2024-09-12
Payer: COMMERCIAL

## 2024-09-19 ENCOUNTER — APPOINTMENT (OUTPATIENT)
Dept: RADIOLOGY | Facility: CLINIC | Age: 34
End: 2024-09-19
Payer: COMMERCIAL

## 2024-09-26 ENCOUNTER — HOSPITAL ENCOUNTER (OUTPATIENT)
Dept: RADIOLOGY | Facility: CLINIC | Age: 34
Discharge: HOME | End: 2024-09-26
Payer: COMMERCIAL

## 2024-09-26 DIAGNOSIS — R42 DIZZINESS: ICD-10-CM

## 2024-09-26 PROCEDURE — 70480 CT ORBIT/EAR/FOSSA W/O DYE: CPT

## 2024-09-30 ENCOUNTER — APPOINTMENT (OUTPATIENT)
Dept: OTOLARYNGOLOGY | Facility: CLINIC | Age: 34
End: 2024-09-30
Payer: COMMERCIAL

## 2024-09-30 VITALS — WEIGHT: 119 LBS | HEIGHT: 62 IN | BODY MASS INDEX: 21.9 KG/M2

## 2024-09-30 DIAGNOSIS — R42 DIZZINESS: Primary | ICD-10-CM

## 2024-09-30 DIAGNOSIS — G43.109 MIGRAINE WITH AURA AND WITHOUT STATUS MIGRAINOSUS, NOT INTRACTABLE: ICD-10-CM

## 2024-09-30 DIAGNOSIS — R55 SYNCOPE, UNSPECIFIED SYNCOPE TYPE: ICD-10-CM

## 2024-09-30 PROCEDURE — 99214 OFFICE O/P EST MOD 30 MIN: CPT | Performed by: OTOLARYNGOLOGY

## 2024-09-30 PROCEDURE — 3008F BODY MASS INDEX DOCD: CPT | Performed by: OTOLARYNGOLOGY

## 2024-09-30 PROCEDURE — 1036F TOBACCO NON-USER: CPT | Performed by: OTOLARYNGOLOGY

## 2024-09-30 ASSESSMENT — PAIN SCALES - GENERAL: PAINLEVEL: 0-NO PAIN

## 2024-09-30 NOTE — PROGRESS NOTES
History of present illness:  Dilip Townsend is a 34 y.o. female referred by Capri Lloyd for evaluation of possible SSCD syndrome.   The patient was here alone.  Her symptoms are detailed in previous notes but essentially she has had episodes of syncope and presyncope and she has had separate episodes of dizziness and imbalance that appear to be associated with headaches phono and photophobia.  She denies any auditory complaints.  She has been treated in the past for migraine and migraine associated dizziness with gabapentin and amitriptyline but it appears that those have worsened her symptoms and she had discontinued taking that treatment.  She has an appointment scheduled to see Dr. Harman to be assessed for autonomic dysfunction.  When she saw our nurse practitioner balance testing completed suggested the possibility of left superior canal dehiscence based on loss thresholds on the VEMP testing.  The patient denies any sound or pressure induced dizziness she has some hyperacusis but that is more in line with her migraine.  Her description is that she does get disproportionately angry when she is hearing significant noise for example when her  is watching a sports game with a loud volume on the TV.  That she denies any sound induced dizziness.  She has no pertinent past otological history.  Of note, she has chronic neck issues and tightness and that she has been undergoing physical therapy for her neck for the last 3 years.    The patient's current medications, active allergies and list of medical problems were reviewed in the EHR and confirmed electronically there are as follows;  Allergies:   Allergies   Allergen Reactions    Gabapentin Nausea/vomiting    Amoxicillin Rash and Nausea/vomiting    Penicillin Rash and Nausea/vomiting     Current list of medications:   Current Outpatient Medications   Medication Sig Dispense Refill    loratadine (Claritin) 5 mg chewable tablet Chew 1 tablet (5 mg) once  daily.      rimegepant (Nurtec ODT) 75 mg tablet,disintegrating Dissolve 1 tablet (75 mg) on or under tongue once daily as needed for migraine. (Patient not taking: Reported on 2024) 16 tablet 11     No current facility-administered medications for this visit.     Problem list:  Patient Active Problem List   Diagnosis    Encounter for consultation for female sterilization    Postoperative visit    Migraine with aura and without status migrainosus, not intractable    Syncope     Medical history:  Past Medical History:   Diagnosis Date    Anxiety 2019    Treating with talk therapy/ CBT    Chronic headaches     Chronic pain disorder     Fibromyalgia, primary 2021    Not sure this is a correct diagnosis, I think it might just be neck pain and  low iron/easily bruise    Fractures , ,     All healed, arm, foot, ankle    Joint pain     Neck , shoulder, hip and knee pain on right side    PONV (postoperative nausea and vomiting) 2014    After egg retrieval surgery    Syncope 2024     Surgical history:  Past Surgical History:   Procedure Laterality Date    INDUCED       NASAL SEPTUM SURGERY      ORTHOPEDIC SURGERY Left     Arm fracture repair    OTHER SURGICAL HISTORY  2014    History Of Prior Surgery    OTHER SURGICAL HISTORY      egg donation x3    SALPINGECTOMY Bilateral      Family history:  Family History   Problem Relation Name Age of Onset    Diabetes Mother Sandra Perez     Depression Mother Sandra Perez     Drug abuse Mother Sandra Perez     Endometriosis Father's Sister      Diabetes Maternal Grandmother Kiara Perez     Cancer Maternal Grandfather Kiara Perez     Autoimmune disease Paternal Grandmother Waleska Jose     Miscarriages / Stillbirths Paternal Grandmother Waleska Jose     Drug abuse Brother Tee Garcia     Miscarriages / Stillbirths Sister Yuri Garcia     Miscarriages / Stillbirths Other Bindu Camacho (paternal aunt)      Social history:  Social  History     Tobacco Use    Smoking status: Never    Smokeless tobacco: Never   Vaping Use    Vaping status: Never Used   Substance Use Topics    Alcohol use: Not Currently    Drug use: Yes     Types: Marijuana     Comment: With Medical Marijuana card, USAGE IS RARE       Physical Examination:  CONSTITUTIONAL:  No acute distress  VOICE:  No hoarseness or other abnormality  RESPIRATION:  Breathing comfortably, no stridor  CV:  No clubbing/cyanosis/edema in hands  EYES:  EOM intact, sclera clear  NEURO:  Alert and oriented times 3, Cranial nerves II-XII grossly intact and symmetric bilaterally  HEAD AND FACE:  Symmetric facial features, no masses or lesions  RIGHT EAR:  Normal external ear and post auricular area, no visible lesions, external auditory canal patent, tympanic membrane intact, no retraction, no signs of mass, effusion, or infection within the middle ear  LEFT EAR: Normal external ear and post auricular area, no visible lesions, external auditory canal patent, tympanic membrane intact, no retraction, no signs of mass, effusion, or infection within the middle ear  NOSE:  External appearance normal without an obvious deformity.  PHARYNGEAL WALLS: Normal lining without obvious masses.  NECK/LYMPH:  No LAD, no thyroid masses, trachea midline  SKIN:  Neck and facial skin is without scar or injury  PSYCH:  Alert and oriented with appropriate mood and affect  Bedside vestibular exam showed negative had overt sign in the left ear, normal Romberg and sharpened Romberg testing.    Audiometric: Her comprehensive audiogram showed normal hearing sensitivity bilaterally, excellent speech discrimination and normal tympanograms.    Balance testing reviewed showed decreased cVEMP thresholds in the left ear possibly suggestive of the third window phenomena and the canal dehiscence.  Ocular VEMP was present at 4 kHz.    HRCT: Personally reviewed the CT scan showed well-pneumatized middle ear and mastoid, normal ossicles, no  inner ear malformation, absence of superior canal dehiscence bilaterally.    I personally reviewed the available patient's external record and independently reviewed their audiometric testing and radiographic imaging through the appropriate viewing software as detailed in my note and agree with the detailed report.    Impression:  Dizziness  Syncope  Migraine  Headaches      Recommendation:  The patient's condition and its significance were reviewed.  Her symptoms do not support a superior canal dehiscence syndrome.  She may have multifactorial cause auricular issue for her dizziness including cervicogenic, and migraine associated dizziness.  Certainly, the syncopal episode need to be worked up for possible autonomic dysfunction which can confuse the picture as well.  I would like her to see one of our dedicated neurologist either Dr. Negro Fish or Dr. Ruddy Hager for her chronic migraine and possibility of migraine vestibulopathy.  At this point she will follow-up with me as needed and she can return to see our nurse practitioner for any other issues related to her hearing or dizziness.  I discussed with the patient the complexity of my medical decision making including the treatment and testing rational, indications of their elective procedure and possible adverse effects and/or complications. Based on the provided documentation and my professional assessment of this patient's acute exacerbation of their chronic condition. The complexity of evaluation and treatment is moderate.    This note was created using speech recognition transcription software. Despite proofreading, several typographical errors might be present that might affect the meaning of the content. Please call with any questions.

## 2024-10-02 ENCOUNTER — TELEPHONE (OUTPATIENT)
Dept: NEUROLOGY | Facility: CLINIC | Age: 34
End: 2024-10-02
Payer: COMMERCIAL

## 2024-10-02 NOTE — TELEPHONE ENCOUNTER
Pt scheduled herself as a follow up visit for 11/07/24 she needs to be a new patient. Left message and number for patient to call to reschedule appoinjtment.

## 2024-10-07 PROCEDURE — RXMED WILLOW AMBULATORY MEDICATION CHARGE

## 2024-10-08 ENCOUNTER — PHARMACY VISIT (OUTPATIENT)
Dept: PHARMACY | Facility: CLINIC | Age: 34
End: 2024-10-08
Payer: COMMERCIAL

## 2024-10-16 ENCOUNTER — HOSPITAL ENCOUNTER (OUTPATIENT)
Dept: NEUROLOGY | Facility: HOSPITAL | Age: 34
Discharge: HOME | End: 2024-10-16
Payer: COMMERCIAL

## 2024-10-16 DIAGNOSIS — R55 SYNCOPE, UNSPECIFIED SYNCOPE TYPE: ICD-10-CM

## 2024-10-28 ENCOUNTER — OFFICE VISIT (OUTPATIENT)
Dept: OBSTETRICS AND GYNECOLOGY | Facility: CLINIC | Age: 34
End: 2024-10-28
Payer: COMMERCIAL

## 2024-10-28 VITALS
DIASTOLIC BLOOD PRESSURE: 84 MMHG | WEIGHT: 117.2 LBS | BODY MASS INDEX: 21.57 KG/M2 | HEIGHT: 62 IN | SYSTOLIC BLOOD PRESSURE: 128 MMHG

## 2024-10-28 DIAGNOSIS — N91.4 SECONDARY OLIGOMENORRHEA: ICD-10-CM

## 2024-10-28 DIAGNOSIS — N76.0 ACUTE VAGINITIS: Primary | ICD-10-CM

## 2024-10-28 DIAGNOSIS — Z11.3 SCREEN FOR STD (SEXUALLY TRANSMITTED DISEASE): ICD-10-CM

## 2024-10-28 PROCEDURE — 3008F BODY MASS INDEX DOCD: CPT | Performed by: OBSTETRICS & GYNECOLOGY

## 2024-10-28 PROCEDURE — 99213 OFFICE O/P EST LOW 20 MIN: CPT | Performed by: OBSTETRICS & GYNECOLOGY

## 2024-10-28 PROCEDURE — 87491 CHLMYD TRACH DNA AMP PROBE: CPT | Mod: WESLAB | Performed by: OBSTETRICS & GYNECOLOGY

## 2024-10-28 PROCEDURE — 87591 N.GONORRHOEAE DNA AMP PROB: CPT | Mod: WESLAB | Performed by: OBSTETRICS & GYNECOLOGY

## 2024-10-28 PROCEDURE — 87661 TRICHOMONAS VAGINALIS AMPLIF: CPT | Mod: WESLAB | Performed by: OBSTETRICS & GYNECOLOGY

## 2024-10-28 PROCEDURE — 87109 MYCOPLASMA: CPT | Performed by: OBSTETRICS & GYNECOLOGY

## 2024-10-28 PROCEDURE — 87077 CULTURE AEROBIC IDENTIFY: CPT | Performed by: OBSTETRICS & GYNECOLOGY

## 2024-10-28 ASSESSMENT — ENCOUNTER SYMPTOMS
DEPRESSION: 0
OCCASIONAL FEELINGS OF UNSTEADINESS: 0
LOSS OF SENSATION IN FEET: 0

## 2024-10-28 ASSESSMENT — PAIN SCALES - GENERAL: PAINLEVEL_OUTOF10: 0-NO PAIN

## 2024-10-28 NOTE — PROGRESS NOTES
"GYN OFFICE VISIT    Patient Name:  Aaliyah Townsend  :  1990  MR #:  [id number]  Acct #:  [id number]      ASSESSMENT/PLAN:     There are no diagnoses linked to this encounter.     Aaliyah \"Dilip\" was seen today for vaginal discharge.  Diagnoses and all orders for this visit:  Acute vaginitis (Primary)  -     Genital Culture  -     Ureaplasma/Mycoplasma Culture  -     C. trachomatis / N. gonorrhoeae, Amplified  -     Trichomonas vaginalis, Amplified  -     fluconazole (Diflucan) 150 mg tablet; Take 1 tablet (150 mg) by mouth 1 time for 1 dose.  Screen for STD (sexually transmitted disease)  -     Genital Culture  -     Ureaplasma/Mycoplasma Culture  -     C. trachomatis / N. gonorrhoeae, Amplified  -     Trichomonas vaginalis, Amplified  Secondary oligomenorrhea  -     CBC; Future  -     Estradiol; Future  -     Follicle Stimulating Hormone; Future  -     TSH with reflex to Free T4 if abnormal; Future  -     US PELVIS TRANSABDOMINAL WITH TRANSVAGINAL; Future  -     Hemoglobin A1C; Future  -     Prolactin; Future  -     Testosterone,Free and Total; Future      No problem-specific Assessment & Plan notes found for this encounter.        .All questions answered.  Diagnosis explained in detail, including differential.  Discussed healthy lifestyle modifications.    No follow-ups on file.      Subjective    Chief Complaint   Patient presents with    Vaginal Discharge       Aaliyah Townsend is a 34 y.o.  Patient's last menstrual period was 2024 (exact date).   female who presents for vag discharge.  2 cycles x 10 months .  No odor.  No recent weight changes.        Past Medical History:   Diagnosis Date    Anxiety 2019    Treating with talk therapy/ CBT    Chronic headaches     Chronic pain disorder     Cluster headache 2015    Fibromyalgia, primary 2021    Not sure this is a correct diagnosis, I think it might just be neck pain and  low iron/easily bruise    Fractures , ,  "    All healed, arm, foot, ankle    Headache, tension-type 2015    Joint pain 2019    Neck , shoulder, hip and knee pain on right side    Migraine 2019    Peripheral neuropathy 2015    PONV (postoperative nausea and vomiting) 2014    After egg retrieval surgery    Syncope 2024    Trigeminal neuralgia        Past Surgical History:   Procedure Laterality Date    INDUCED       NASAL SEPTUM SURGERY      ORTHOPEDIC SURGERY Left     Arm fracture repair    OTHER SURGICAL HISTORY  2014    History Of Prior Surgery    OTHER SURGICAL HISTORY      egg donation x3    SALPINGECTOMY Bilateral        Social History     Socioeconomic History    Marital status:      Spouse name: Not on file    Number of children: 0    Years of education: Not on file    Highest education level: Not on file   Occupational History    Not on file   Tobacco Use    Smoking status: Never    Smokeless tobacco: Never   Vaping Use    Vaping status: Never Used   Substance and Sexual Activity    Alcohol use: Not Currently     Alcohol/week: 1.0 standard drink of alcohol     Types: 1 Glasses of wine per week     Comment: Once in a while    Drug use: Yes     Types: Marijuana     Comment: With Medical Marijuana card    Sexual activity: Yes     Partners: Male     Birth control/protection: Female Sterilization   Other Topics Concern    Not on file   Social History Narrative    ** Merged History Encounter **          Social Drivers of Health     Financial Resource Strain: Not on file   Food Insecurity: Not on file   Transportation Needs: Not on file   Physical Activity: Not on file   Stress: Not on file   Social Connections: Not on file   Intimate Partner Violence: Not on file   Housing Stability: Not on file       Family History   Problem Relation Name Age of Onset    Diabetes Mother Sanrda Perez     Depression Mother Sandra Perez     Drug abuse Mother Sandra Perez     Mental illness Mother Sandra Perez     Endometriosis Father's Sister       "Diabetes Maternal Grandmother Kiara Todd     Cancer Maternal Grandfather Kiara Todd     Autoimmune disease Paternal Grandmother Waleska Garcia     Miscarriages / Stillbirths Paternal Grandmother Waleska Garcia     Stroke Paternal Grandmother Waleska Garcia     Drug abuse Brother Tee Garcia     Miscarriages / Stillbirths Sister Yuri Garcia     Miscarriages / Stillbirths Other Bindu Camacho (paternal aunt)     Migraines Sister Yuri Garcia        Prior to Admission medications    Medication Sig Start Date End Date Taking? Authorizing Provider   loratadine (Claritin) 5 mg chewable tablet Chew 1 tablet (5 mg) once daily.   Yes Historical Provider, MD   rimegepant (Nurtec ODT) 75 mg tablet,disintegrating Dissolve 1 tablet (75 mg) on or under tongue once daily as needed for migraine. 8/20/24 8/20/25 Yes Navi Tony MD       Allergies   Allergen Reactions    Gabapentin Nausea/vomiting    Amoxicillin Rash and Nausea/vomiting    Penicillin Rash and Nausea/vomiting              OBJECTIVE:   /84   Ht 1.575 m (5' 2\")   Wt 53.2 kg (117 lb 3.2 oz)   LMP 08/01/2024 (Exact Date)   BMI 21.44 kg/m²   Body mass index is 21.44 kg/m².     Physical Exam  Vitals and nursing note reviewed.   Constitutional:       General: She is not in acute distress.  Cardiovascular:      Rate and Rhythm: Normal rate and regular rhythm.      Heart sounds: No murmur heard.     No friction rub. No gallop.   Pulmonary:      Effort: Pulmonary effort is normal.      Breath sounds: Normal breath sounds. No wheezing or rales.   Abdominal:      General: Bowel sounds are normal. There is no distension.      Palpations: Abdomen is soft. There is no mass.      Tenderness: There is no abdominal tenderness. There is no guarding or rebound.      Hernia: No hernia is present.   Genitourinary:     General: Normal vulva.      Labia:         Right: No rash or lesion.         Left: No rash or lesion.       Urethra: No urethral pain or urethral swelling.     "  Vagina: Vaginal discharge (candidiasis noted.  cultures obtained) present. No erythema, tenderness, bleeding or lesions.      Cervix: No cervical motion tenderness, discharge, friability, lesion, erythema, cervical bleeding or eversion.      Uterus: Normal. Not enlarged and not tender.       Adnexa:         Right: No mass, tenderness or fullness.          Left: No mass, tenderness or fullness.     Neurological:      Mental Status: She is alert.   Psychiatric:         Mood and Affect: Mood normal.         Behavior: Behavior normal.         Thought Content: Thought content normal.         Judgment: Judgment normal.             Note: This dictation was generated using Dragon voice recognition software. Please excuse any grammatical or spelling errors that may have occurred using the system.

## 2024-10-29 LAB
C TRACH RRNA SPEC QL NAA+PROBE: NEGATIVE
N GONORRHOEA DNA SPEC QL PROBE+SIG AMP: NEGATIVE
T VAGINALIS RRNA SPEC QL NAA+PROBE: NEGATIVE

## 2024-11-01 LAB — BACTERIA GENITAL AEROBE CULT: ABNORMAL

## 2024-11-02 LAB — UREAPLASMA/MYCOPLASMA CULTURE: NORMAL

## 2024-11-02 RX ORDER — FLUCONAZOLE 150 MG/1
150 TABLET ORAL ONCE
Qty: 1 TABLET | Refills: 0 | Status: SHIPPED | OUTPATIENT
Start: 2024-11-02 | End: 2024-11-02

## 2024-11-05 ENCOUNTER — TELEPHONE (OUTPATIENT)
Dept: NEUROLOGY | Facility: CLINIC | Age: 34
End: 2024-11-05
Payer: COMMERCIAL

## 2024-11-06 DIAGNOSIS — G90.A POTS (POSTURAL ORTHOSTATIC TACHYCARDIA SYNDROME): Primary | ICD-10-CM

## 2024-11-06 DIAGNOSIS — M35.00 SICCA, UNSPECIFIED TYPE (MULTI): ICD-10-CM

## 2024-11-06 DIAGNOSIS — G90.1 DYSAUTONOMIA (MULTI): ICD-10-CM

## 2024-11-07 ENCOUNTER — APPOINTMENT (OUTPATIENT)
Dept: NEUROLOGY | Facility: CLINIC | Age: 34
End: 2024-11-07
Payer: COMMERCIAL

## 2024-11-08 ENCOUNTER — SPECIALTY PHARMACY (OUTPATIENT)
Dept: PHARMACY | Facility: CLINIC | Age: 34
End: 2024-11-08

## 2024-11-08 PROCEDURE — RXMED WILLOW AMBULATORY MEDICATION CHARGE

## 2024-11-11 ENCOUNTER — PHARMACY VISIT (OUTPATIENT)
Dept: PHARMACY | Facility: CLINIC | Age: 34
End: 2024-11-11
Payer: COMMERCIAL

## 2024-11-14 ENCOUNTER — APPOINTMENT (OUTPATIENT)
Dept: NEUROLOGY | Facility: CLINIC | Age: 34
End: 2024-11-14
Payer: COMMERCIAL

## 2024-11-20 ENCOUNTER — HOSPITAL ENCOUNTER (OUTPATIENT)
Dept: RADIOLOGY | Facility: HOSPITAL | Age: 34
Discharge: HOME | End: 2024-11-20
Payer: COMMERCIAL

## 2024-11-20 DIAGNOSIS — N91.4 SECONDARY OLIGOMENORRHEA: ICD-10-CM

## 2024-11-20 PROCEDURE — 76830 TRANSVAGINAL US NON-OB: CPT | Performed by: RADIOLOGY

## 2024-11-20 PROCEDURE — 76856 US EXAM PELVIC COMPLETE: CPT | Performed by: RADIOLOGY

## 2024-11-20 PROCEDURE — 76856 US EXAM PELVIC COMPLETE: CPT

## 2024-12-09 ENCOUNTER — APPOINTMENT (OUTPATIENT)
Dept: OTOLARYNGOLOGY | Facility: CLINIC | Age: 34
End: 2024-12-09
Payer: COMMERCIAL

## 2024-12-12 ENCOUNTER — APPOINTMENT (OUTPATIENT)
Dept: NEUROLOGY | Facility: HOSPITAL | Age: 34
End: 2024-12-12
Payer: COMMERCIAL

## 2025-01-21 ENCOUNTER — LAB (OUTPATIENT)
Dept: LAB | Facility: LAB | Age: 35
End: 2025-01-21
Payer: COMMERCIAL

## 2025-01-21 ENCOUNTER — APPOINTMENT (OUTPATIENT)
Dept: OTOLARYNGOLOGY | Facility: CLINIC | Age: 35
End: 2025-01-21
Payer: COMMERCIAL

## 2025-01-21 VITALS
SYSTOLIC BLOOD PRESSURE: 120 MMHG | WEIGHT: 117 LBS | BODY MASS INDEX: 21.53 KG/M2 | DIASTOLIC BLOOD PRESSURE: 73 MMHG | HEIGHT: 62 IN

## 2025-01-21 DIAGNOSIS — G90.A POTS (POSTURAL ORTHOSTATIC TACHYCARDIA SYNDROME): ICD-10-CM

## 2025-01-21 DIAGNOSIS — G90.1 DYSAUTONOMIA (MULTI): ICD-10-CM

## 2025-01-21 DIAGNOSIS — N91.4 SECONDARY OLIGOMENORRHEA: ICD-10-CM

## 2025-01-21 DIAGNOSIS — M35.00 SICCA, UNSPECIFIED TYPE (MULTI): ICD-10-CM

## 2025-01-21 LAB
CENTROMERE B AB SER-ACNC: <0.2 AI
CERULOPLASMIN SERPL-MCNC: 28 MG/DL (ref 20–60)
CHROMATIN AB SERPL-ACNC: <0.2 AI
DSDNA AB SER-ACNC: <1 IU/ML
ENA JO1 AB SER QL IA: <0.2 AI
ENA RNP AB SER IA-ACNC: <0.2 AI
ENA SCL70 AB SER QL IA: <0.2 AI
ENA SM AB SER IA-ACNC: <0.2 AI
ENA SM+RNP AB SER QL IA: <0.2 AI
ENA SS-A AB SER IA-ACNC: <0.2 AI
ENA SS-B AB SER IA-ACNC: <0.2 AI
ERYTHROCYTE [DISTWIDTH] IN BLOOD BY AUTOMATED COUNT: 13.2 % (ref 11.5–14.5)
EST. AVERAGE GLUCOSE BLD GHB EST-MCNC: 94 MG/DL
ESTRADIOL SERPL-MCNC: 35 PG/ML
FSH SERPL-ACNC: 9 IU/L
GLIADIN PEPTIDE IGA SER IA-ACNC: 1.3 U/ML
HBA1C MFR BLD: 4.9 %
HCT VFR BLD AUTO: 38.9 % (ref 36–46)
HGB BLD-MCNC: 12.2 G/DL (ref 12–16)
MCH RBC QN AUTO: 28.8 PG (ref 26–34)
MCHC RBC AUTO-ENTMCNC: 31.4 G/DL (ref 32–36)
MCV RBC AUTO: 92 FL (ref 80–100)
NRBC BLD-RTO: 0 /100 WBCS (ref 0–0)
PLATELET # BLD AUTO: 322 X10*3/UL (ref 150–450)
PROLACTIN SERPL-MCNC: 10.8 UG/L (ref 3–20)
PROT SERPL-MCNC: 7.5 G/DL (ref 6.4–8.2)
PROT UR-ACNC: 11 MG/DL (ref 5–25)
RBC # BLD AUTO: 4.24 X10*6/UL (ref 4–5.2)
RHEUMATOID FACT SER NEPH-ACNC: <10 IU/ML (ref 0–15)
RIBOSOMAL P AB SER-ACNC: <0.2 AI
TSH SERPL-ACNC: 1.64 MIU/L (ref 0.44–3.98)
TTG IGA SER IA-ACNC: <1 U/ML
VIT B12 SERPL-MCNC: 762 PG/ML (ref 211–911)
WBC # BLD AUTO: 7.6 X10*3/UL (ref 4.4–11.3)

## 2025-01-21 PROCEDURE — 85027 COMPLETE CBC AUTOMATED: CPT

## 2025-01-21 PROCEDURE — 88305 TISSUE EXAM BY PATHOLOGIST: CPT

## 2025-01-21 PROCEDURE — 83036 HEMOGLOBIN GLYCOSYLATED A1C: CPT

## 2025-01-21 PROCEDURE — 82670 ASSAY OF TOTAL ESTRADIOL: CPT

## 2025-01-21 PROCEDURE — 84166 PROTEIN E-PHORESIS/URINE/CSF: CPT | Performed by: STUDENT IN AN ORGANIZED HEALTH CARE EDUCATION/TRAINING PROGRAM

## 2025-01-21 PROCEDURE — 42405 BIOPSY OF SALIVARY GLAND: CPT | Performed by: STUDENT IN AN ORGANIZED HEALTH CARE EDUCATION/TRAINING PROGRAM

## 2025-01-21 PROCEDURE — 84156 ASSAY OF PROTEIN URINE: CPT

## 2025-01-21 PROCEDURE — 86334 IMMUNOFIX E-PHORESIS SERUM: CPT

## 2025-01-21 PROCEDURE — 88305 TISSUE EXAM BY PATHOLOGIST: CPT | Performed by: STUDENT IN AN ORGANIZED HEALTH CARE EDUCATION/TRAINING PROGRAM

## 2025-01-21 PROCEDURE — 84207 ASSAY OF VITAMIN B-6: CPT

## 2025-01-21 PROCEDURE — 83521 IG LIGHT CHAINS FREE EACH: CPT

## 2025-01-21 PROCEDURE — 86235 NUCLEAR ANTIGEN ANTIBODY: CPT

## 2025-01-21 PROCEDURE — 86335 IMMUNFIX E-PHORSIS/URINE/CSF: CPT

## 2025-01-21 PROCEDURE — 84166 PROTEIN E-PHORESIS/URINE/CSF: CPT

## 2025-01-21 PROCEDURE — 83001 ASSAY OF GONADOTROPIN (FSH): CPT

## 2025-01-21 PROCEDURE — 84146 ASSAY OF PROLACTIN: CPT

## 2025-01-21 PROCEDURE — 82607 VITAMIN B-12: CPT

## 2025-01-21 PROCEDURE — 86320 SERUM IMMUNOELECTROPHORESIS: CPT | Performed by: STUDENT IN AN ORGANIZED HEALTH CARE EDUCATION/TRAINING PROGRAM

## 2025-01-21 PROCEDURE — 88312 SPECIAL STAINS GROUP 1: CPT

## 2025-01-21 PROCEDURE — 86325 OTHER IMMUNOELECTROPHORESIS: CPT | Performed by: STUDENT IN AN ORGANIZED HEALTH CARE EDUCATION/TRAINING PROGRAM

## 2025-01-21 PROCEDURE — 88312 SPECIAL STAINS GROUP 1: CPT | Performed by: STUDENT IN AN ORGANIZED HEALTH CARE EDUCATION/TRAINING PROGRAM

## 2025-01-21 PROCEDURE — 36415 COLL VENOUS BLD VENIPUNCTURE: CPT

## 2025-01-21 PROCEDURE — 83516 IMMUNOASSAY NONANTIBODY: CPT

## 2025-01-21 PROCEDURE — 86038 ANTINUCLEAR ANTIBODIES: CPT

## 2025-01-21 PROCEDURE — 84402 ASSAY OF FREE TESTOSTERONE: CPT

## 2025-01-21 PROCEDURE — 84446 ASSAY OF VITAMIN E: CPT

## 2025-01-21 PROCEDURE — 83520 IMMUNOASSAY QUANT NOS NONAB: CPT

## 2025-01-21 PROCEDURE — 82390 ASSAY OF CERULOPLASMIN: CPT

## 2025-01-21 PROCEDURE — 84443 ASSAY THYROID STIM HORMONE: CPT

## 2025-01-21 PROCEDURE — 86225 DNA ANTIBODY NATIVE: CPT

## 2025-01-21 PROCEDURE — 83921 ORGANIC ACID SINGLE QUANT: CPT

## 2025-01-21 PROCEDURE — 82525 ASSAY OF COPPER: CPT

## 2025-01-21 PROCEDURE — 3008F BODY MASS INDEX DOCD: CPT | Performed by: STUDENT IN AN ORGANIZED HEALTH CARE EDUCATION/TRAINING PROGRAM

## 2025-01-21 PROCEDURE — 82384 ASSAY THREE CATECHOLAMINES: CPT

## 2025-01-21 PROCEDURE — 84165 PROTEIN E-PHORESIS SERUM: CPT | Performed by: STUDENT IN AN ORGANIZED HEALTH CARE EDUCATION/TRAINING PROGRAM

## 2025-01-21 PROCEDURE — 86431 RHEUMATOID FACTOR QUANT: CPT

## 2025-01-21 PROCEDURE — 84165 PROTEIN E-PHORESIS SERUM: CPT

## 2025-01-21 PROCEDURE — 1036F TOBACCO NON-USER: CPT | Performed by: STUDENT IN AN ORGANIZED HEALTH CARE EDUCATION/TRAINING PROGRAM

## 2025-01-21 PROCEDURE — 84155 ASSAY OF PROTEIN SERUM: CPT

## 2025-01-21 NOTE — PROGRESS NOTES
"Patient ID: Aaliyah Townsned \"Dilip\" is a 34 y.o. female.    Lip biopsy    Date/Time: 1/21/2025 9:13 AM    Performed by: Shadia Sanderson MD  Authorized by: Shadia Sanderson MD    Consent:     Consent obtained:  Verbal    Consent given by:  Patient    Risks discussed:  Bleeding, pain and infection  Universal protocol:     Patient identity confirmed:  Verbally with patient  Indications:     Indications:  Rule out Sjogren's  Sedation:     Sedation type:  None  Anesthesia:     Anesthesia method:  Local infiltration    Local anesthetic:  Lidocaine 1% WITH epi  Procedure specific details:      After infiltration with local anesthesia, an incision was made on the mucosal surface of the lower lip to the right of midline. Minor salivary glands were teased out using sharp dissection and placed in formalin for pathology. 4 clusters of minor salivary glands were obtained. The muscle layer was left intact deep to the minor salivary glands. Hemostasis was achieved with pressure and the incision was closed with two 4-0 chromic sutures.  Post-procedure details:     Procedure completion:  Tolerated well, no immediate complications    "

## 2025-01-22 LAB — ANA SER QL HEP2 SUBST: NEGATIVE

## 2025-01-23 LAB
COPPER SERPL-MCNC: 120.9 UG/DL (ref 80–155)
GLIADIN PEPTIDE IGG SER IA-ACNC: <0.56 FLU (ref 0–4.99)
KAPPA LC SERPL-MCNC: 1.66 MG/DL (ref 0.33–1.94)
KAPPA LC/LAMBDA SER: 1.11 {RATIO} (ref 0.26–1.65)
LAMBDA LC SERPL-MCNC: 1.49 MG/DL (ref 0.57–2.63)
TTG IGG SER IA-ACNC: <0.82 FLU (ref 0–4.99)

## 2025-01-24 LAB
A-TOCOPHEROL VIT E SERPL-MCNC: 9 MG/L (ref 5.5–18)
ALBUMIN MFR UR ELPH: 37.3 %
ALBUMIN: 4.7 G/DL (ref 3.4–5)
ALPHA 1 GLOBULIN: 0.3 G/DL (ref 0.2–0.6)
ALPHA 2 GLOBULIN: 0.7 G/DL (ref 0.4–1.1)
ALPHA1 GLOB MFR UR ELPH: 5.1 %
ALPHA2 GLOB MFR UR ELPH: 20 %
B-GLOBULIN MFR UR ELPH: 20.8 %
BETA GLOBULIN: 0.8 G/DL (ref 0.5–1.2)
BETA+GAMMA TOCOPHEROL SERPL-MCNC: 1.6 MG/L (ref 0–6)
GAMMA GLOB MFR UR ELPH: 16.8 %
GAMMA GLOBULIN: 1.1 G/DL (ref 0.5–1.4)
IMMUNOFIXATION COMMENT: NORMAL
IMMUNOFIXATION COMMENT: NORMAL
PATH REVIEW - SERUM IMMUNOFIXATION: NORMAL
PATH REVIEW - URINE IMMUNOFIXATION: NORMAL
PATH REVIEW-SERUM PROTEIN ELECTROPHORESIS: NORMAL
PATH REVIEW-URINE PROTEIN ELECTROPHORESIS: NORMAL
PROTEIN ELECTROPHORESIS COMMENT: NORMAL
PYRIDOXAL PHOS SERPL-SCNC: 56.6 NMOL/L (ref 20–125)
TESTOSTERONE FREE (CHAN): 3.9 PG/ML (ref 0.1–6.4)
TESTOSTERONE,TOTAL,LC-MS/MS: 36 NG/DL (ref 2–45)
URINE ELECTROPHORESIS COMMENT: NORMAL

## 2025-01-25 LAB
DOPAMINE SERPL-MCNC: <30 PG/ML (ref 0–48)
EPINEPH PLAS-MCNC: <15 PG/ML (ref 0–62)
NOREPINEPH PLAS-MCNC: 482 PG/ML (ref 0–874)

## 2025-01-29 LAB
METHYLMALONATE SERPL-SCNC: <0.2 UMOL/L (ref 0–0.4)
SCAN RESULT: NORMAL

## 2025-01-30 LAB
COPPER UR-MCNC: 12 UG/L
COPPER/CREAT UR: 8 UG/G CREAT (ref 0–49)
CREAT UR-MCNC: 1.51 G/L (ref 0.3–3)

## 2025-02-13 DIAGNOSIS — M25.50 POLYARTHRALGIA: Primary | ICD-10-CM

## 2025-02-13 NOTE — PROGRESS NOTES
Lip biopsy is back and negative for Sjogren focus score 0.  However, there is note of minute focus of epithelioid histiocytes, suggestive of a nonnecrotizing granuloma, in the background of chronic lymphoplasmacytic infiltration and periductal fibrosis. AFB and GMS are negative for microorganisms.     In addition she continues to have significant diffuse joint pains.    Will send for rheumatology referral.

## 2025-02-20 ENCOUNTER — APPOINTMENT (OUTPATIENT)
Dept: NEUROLOGY | Facility: HOSPITAL | Age: 35
End: 2025-02-20
Payer: COMMERCIAL

## 2025-02-23 ASSESSMENT — RHEUMATOLOGY NEW PATIENT QUESTIONNAIRE
UNUSUAL FATIGUE: Y
MEMORY LOSS: N
NIGHT SWEATS: N
BLACK STOOLS: N
ANXIETY: Y
JOINT SWELLING: Y
LOSS OF VISION: N
FAINTING: Y
SWOLLEN LEGS OR FEET: N
EASILY LOSING TEMPER: Y
HOW WOULD YOU DESCRIBE YOUR STIFFNESS ON AVERAGE: MODERATE
SWOLLEN OR TENDER GLANDS: Y
BEHAVIORAL CHANGES: N
EYE PAIN: Y
COUGH: N
SHORTNESS OF BREATH: N
BLOOD IN STOOLS: Y
FEVER: N
JAUNDICE: N
DIFFICULTY BREATHING LYING DOWN: N
EXCESSIVE HAIR LOSS (MORE THAN YOUR NORM): N
MORNING STIFFNESS IN LOWER BACK: Y
RASH: Y
EYE REDNESS: N
LOSS OF CONSCIOUSNESS: N
SKIN REDNESS: Y
DIFFICULTY STAYING ASLEEP: Y
NODULES/BUMPS: N
NUMBNESS OR TINGLING IN HANDS OR FEET: Y
UNEXPLAINED WEIGHT CHANGE: Y
DOUBLE OR BLURRED VISION: N
PAIN OR BURNING ON URINATION: N
VAGINAL DRYNESS: N
UNUSUALLY RAPID OR SLOWED HEART RATE: Y
SEIZURES: N
DEPRESSION: N
JOINT PAIN: Y
SKIN TIGHTNESS: N
RASH OR ULCERS: N
ANEMIA: Y
INCREASED SUSCEPTIBILITY TO INFECTION: Y
MUSCLE WEAKNESS: Y
STOMACH PAIN: N
MORNING STIFFNESS: Y
UNEXPLAINED HEARING LOSS: N
SUN SENSITIVE (SUN ALLERGY): Y
COLOR CHANGES OF HANDS OR FEET IN THE COLD: Y
EASY BRUISING: Y
PERSISTENT DIARRHEA: Y
UNUSUAL BLEEDING: N
AGITATION: Y
VOMITING OF BLOOD OR COFFEE GROUND CONSISTENCY MATERIAL: N
CHEST PAIN: Y
NAUSEA: Y
HEADACHES: Y
SORES IN MOUTH OR NOSE: Y
HOARSE VOICE: N
EYE DRYNESS: N
DIFFICULTY SWALLOWING: N
HEARTBURN OR REFLUX: N
ABNORMAL URINE: N
DRYNESS OF MOUTH: N
DIFFICULTY FALLING ASLEEP: N

## 2025-02-23 NOTE — PROGRESS NOTES
Rheumatology Note      Patient Aaliyah Townsend  MRN [id number]     CC: Ms. Aaliyah Townsend is a 34 y.o. female presents to the clinic for ***    Subjective    Subjective   History of Presenting Illness  Ms. Aaliyah Townsend is a 34 y.o. female with a past medical history as mentioned below, coming in today with ***      postural dizziness, syncopal episodes, frequent diarrhea, urinary issues, and heart palpitations     ROS:  Constitutional: Denies fever, chills, fatigue  Head: Denies headaches or hair loss  Eyes: Denies dry eyes, blurry vision, redness of the eyes, pain in the eyes or H/O uveitis  ENT: Denies dry mouth, loss of taste, sores in the mouth, nose bleed, or difficulty swallowing  Cardiovascular: Denies chest pain, palpitations  Respiratory: Denies shortness of breath or cough or wheezing  Gastrointestinal: Denies nausea, vomiting, heartburn, abdominal pain , diarrhea or blood in the stool  Genitourinary: No urinary urgency, frequency, dysuria   Integumentary: Denies photosensitivity, rash or lesions, Raynaud's or psoriasis  Neurological: Denies any numbness or tingling or weakness  Hematologic/Lymphatic: Denies bleeding, bruising, Raynaud's phenomenon  MSK: As per HPI. No enthesitis, sausage finger, finger tip ulceration, or back pain    Past Medical History:   Diagnosis Date    Anxiety 09/08/2019    Treating with talk therapy/ CBT    Chronic headaches     Chronic pain disorder     Cluster headache 2015    Fibromyalgia, primary 06/30/2021    Not sure this is a correct diagnosis, I think it might just be neck pain and  low iron/easily bruise    Fractures 2001, 2002, 2014    All healed, arm, foot, ankle    Headache, tension-type 2015    Joint pain 2019    Neck , shoulder, hip and knee pain on right side    Migraine 2019    Peripheral neuropathy 2015    PONV (postoperative nausea and vomiting) 08/26/2014    After egg retrieval surgery    Syncope 08/20/2024    Trigeminal neuralgia 2019         Allergies   Allergen Reactions    Gabapentin Nausea/vomiting    Amoxicillin Rash and Nausea/vomiting    Penicillin Rash and Nausea/vomiting        Objective   Objective     There were no vitals taken for this visit.     PHYSICAL EXAM:  General - NAD, pleasant, AAOx3  Head: Normocephalic, atraumatic  Eyes - PERRLA, EOMI. No conjunctiva injection.   Mouth/ENT - Moist oral and nasal mucosa. No facial rash. No enlarged parotid or submandibular gland. Adequate salivary pooling.  Cardiovascular - Regular rate and rhythm. No murmurs or rubs.  Lungs - Clear to auscultation bilaterally.   Skin - No rashes or ulcers. No erythema on bilateral cheeks.  Abdomen - Soft, non-tender. No masses.   Extremities - No edema, cyanosis ,or clubbing  Neurological - Alert and oriented x 3,  grossly intact. No focal deficit.    Musculoskeletal  Shoulders: Full ROM, without pain, no swelling, warmth or tenderness.  Elbows: Full ROM, without pain, no swelling, warmth or tenderness.  Wrists: Full ROM, without pain, no swelling, warmth or tenderness.  MCP: No swelling, warmth or tenderness. Metacarpal squeeze negative  PIP: No swelling, warmth or tenderness.  DIP: No swelling, warmth or tenderness.  Hands : 5/5.    Sacroiliac joints: No local tenderness. СВЕТЛАНА negative.   Hips: Full ROM.  No malalignment.  Knees:  Full ROM, without pain, no swelling, warmth or point tenderness. No joint line tenderness, no pes anserine tenderness.  Ankles: Full ROM, without pain, swelling, warmth or tenderness.  Toes: No swelling, warmth or tenderness. Metatarsal squeeze negative  Cervical spine: No tenderness or limitation of movement  Lumbar spine: No tenderness or limitation of movement     LABS:  Lab Results   Component Value Date    WBC 7.6 01/21/2025    HGB 12.2 01/21/2025    HCT 38.9 01/21/2025    MCV 92 01/21/2025     01/21/2025      Lab Results   Component Value Date    GLUCOSE 94 08/27/2024    CO2 28 08/27/2024    BUN 11 08/27/2024     "EGFR >90 08/27/2024    CALCIUM 9.6 08/27/2024    ALBUMIN 4.2 08/27/2024      No results found for: \"CRP\"  Lab Results   Component Value Date    SEDRATE 17 (H) 10/08/2021     No results found for: \"C3\", \"C4\"  Lab Results   Component Value Date    RF <10 01/21/2025     Lab Results   Component Value Date    ANATITER  10/08/2021     Negative  Reference range: NEGATIVE    Normal range : negative at <1:80 serum dilution.      ARNP <0.2 01/21/2025    ASMRN <0.2 01/21/2025    ASSA <0.2 01/21/2025    ASSB <0.2 01/21/2025    ASCL <0.2 01/21/2025    JO1 <0.2 01/21/2025    ACHR <0.2 01/21/2025    ACEN <0.2 01/21/2025    DNADS <1.0 01/21/2025     Lab Results   Component Value Date    PROTUR TRACE (A) 10/08/2021    GLUCOSEU NEGATIVE 10/08/2021    BLOODU NEGATIVE 10/08/2021    KETONESU NEGATIVE 10/08/2021    NITRITEU NEGATIVE 10/08/2021    LEUKOCYTESU NEGATIVE 10/08/2021     No results found for: \"UTPCR\"     No results found for: \"URICACID\"  No results found for: \"COLORFL\", \"CLARITYFLUID\", \"WBCFL\", \"NEUTROBFREL\", \"LYMPHSBFREL\", \"EOSBFREL\", \"BASOBFREL\", \"PLASMACFLD\", \"RBCFL\", \"CRYSFL\"    IMAGING:  Lip biopsy 1/2025      Assessment    Assessment & Plan   Ms. Aaliyah Townsend is a 34 y.o. female who presents with ***         Independently reviewed three or more labs  Images including CXR, CT, MRI independently reviewed.   Monitoring for drug toxicity     Case seen and discussed with  ***  Signature: Praveen Alicea,   Date: February 23, 2025    " Placed This Encounter   Procedures    XR sacroiliac joints 3+ views    XR chest 2 views    Sedimentation Rate    C-reactive protein    Citrulline Antibody, IgG    Vitamin D 25-Hydroxy,Total (for eval of Vitamin D levels)        Independently reviewed three or more labs  Images including CXR, CT, MRI independently reviewed.   Monitoring for drug toxicity     Case seen and discussed with Dr. Butt    Signature: Praveen Alicea DO  Date: February 23, 2025

## 2025-02-24 ENCOUNTER — APPOINTMENT (OUTPATIENT)
Dept: RHEUMATOLOGY | Facility: CLINIC | Age: 35
End: 2025-02-24
Payer: COMMERCIAL

## 2025-02-24 VITALS
BODY MASS INDEX: 21.35 KG/M2 | WEIGHT: 116 LBS | TEMPERATURE: 97.8 F | DIASTOLIC BLOOD PRESSURE: 81 MMHG | HEIGHT: 62 IN | HEART RATE: 78 BPM | SYSTOLIC BLOOD PRESSURE: 115 MMHG

## 2025-02-24 DIAGNOSIS — M47.812 SPONDYLOSIS OF CERVICAL REGION WITHOUT MYELOPATHY OR RADICULOPATHY: ICD-10-CM

## 2025-02-24 DIAGNOSIS — M25.552 BILATERAL HIP PAIN: Primary | ICD-10-CM

## 2025-02-24 DIAGNOSIS — L92.9 NON-CASEATING GRANULOMA: ICD-10-CM

## 2025-02-24 DIAGNOSIS — M25.50 POLYARTHRALGIA: ICD-10-CM

## 2025-02-24 DIAGNOSIS — M25.551 BILATERAL HIP PAIN: Primary | ICD-10-CM

## 2025-02-24 PROCEDURE — 3008F BODY MASS INDEX DOCD: CPT

## 2025-02-24 PROCEDURE — 1036F TOBACCO NON-USER: CPT

## 2025-02-24 PROCEDURE — 99204 OFFICE O/P NEW MOD 45 MIN: CPT

## 2025-02-24 RX ORDER — MELOXICAM 15 MG/1
15 TABLET ORAL DAILY
Qty: 30 TABLET | Refills: 0 | Status: SHIPPED | OUTPATIENT
Start: 2025-02-24 | End: 2025-03-26

## 2025-03-12 ENCOUNTER — SPECIALTY PHARMACY (OUTPATIENT)
Dept: PHARMACY | Facility: CLINIC | Age: 35
End: 2025-03-12

## 2025-03-12 PROCEDURE — RXMED WILLOW AMBULATORY MEDICATION CHARGE

## 2025-03-13 ENCOUNTER — PHARMACY VISIT (OUTPATIENT)
Dept: PHARMACY | Facility: CLINIC | Age: 35
End: 2025-03-13
Payer: COMMERCIAL

## 2025-03-21 ENCOUNTER — HOSPITAL ENCOUNTER (OUTPATIENT)
Dept: RADIOLOGY | Facility: HOSPITAL | Age: 35
Discharge: HOME | End: 2025-03-21
Payer: COMMERCIAL

## 2025-03-21 DIAGNOSIS — M25.552 BILATERAL HIP PAIN: ICD-10-CM

## 2025-03-21 DIAGNOSIS — M25.551 BILATERAL HIP PAIN: ICD-10-CM

## 2025-03-21 DIAGNOSIS — L92.9 NON-CASEATING GRANULOMA: ICD-10-CM

## 2025-03-21 PROCEDURE — 72202 X-RAY EXAM SI JOINTS 3/> VWS: CPT

## 2025-03-21 PROCEDURE — 71046 X-RAY EXAM CHEST 2 VIEWS: CPT

## 2025-03-22 LAB
25(OH)D3+25(OH)D2 SERPL-MCNC: 32 NG/ML (ref 30–100)
CCP IGG SERPL-ACNC: NORMAL
CRP SERPL-MCNC: NORMAL MG/L
ERYTHROCYTE [SEDIMENTATION RATE] IN BLOOD BY WESTERGREN METHOD: 9 MM/H

## 2025-03-24 LAB
25(OH)D3+25(OH)D2 SERPL-MCNC: 32 NG/ML (ref 30–100)
CRP SERPL-MCNC: <3 MG/L
ERYTHROCYTE [SEDIMENTATION RATE] IN BLOOD BY WESTERGREN METHOD: 9 MM/H

## 2025-03-25 LAB — CCP IGG SERPL-ACNC: <16 UNITS

## 2025-04-07 ENCOUNTER — SPECIALTY PHARMACY (OUTPATIENT)
Dept: PHARMACY | Facility: CLINIC | Age: 35
End: 2025-04-07

## 2025-04-07 PROCEDURE — RXMED WILLOW AMBULATORY MEDICATION CHARGE

## 2025-04-08 ENCOUNTER — PHARMACY VISIT (OUTPATIENT)
Dept: PHARMACY | Facility: CLINIC | Age: 35
End: 2025-04-08
Payer: COMMERCIAL

## 2025-04-21 ENCOUNTER — APPOINTMENT (OUTPATIENT)
Dept: PRIMARY CARE | Facility: CLINIC | Age: 35
End: 2025-04-21
Payer: COMMERCIAL

## 2025-05-02 ENCOUNTER — APPOINTMENT (OUTPATIENT)
Dept: PRIMARY CARE | Facility: CLINIC | Age: 35
End: 2025-05-02
Payer: COMMERCIAL

## 2025-05-06 PROCEDURE — RXMED WILLOW AMBULATORY MEDICATION CHARGE

## 2025-05-10 ENCOUNTER — PHARMACY VISIT (OUTPATIENT)
Dept: PHARMACY | Facility: CLINIC | Age: 35
End: 2025-05-10
Payer: COMMERCIAL

## 2025-05-29 ENCOUNTER — APPOINTMENT (OUTPATIENT)
Dept: PRIMARY CARE | Facility: CLINIC | Age: 35
End: 2025-05-29
Payer: COMMERCIAL

## 2025-05-29 VITALS
TEMPERATURE: 97.5 F | SYSTOLIC BLOOD PRESSURE: 110 MMHG | OXYGEN SATURATION: 99 % | BODY MASS INDEX: 21.16 KG/M2 | HEIGHT: 62 IN | RESPIRATION RATE: 18 BRPM | HEART RATE: 102 BPM | DIASTOLIC BLOOD PRESSURE: 82 MMHG | WEIGHT: 115 LBS

## 2025-05-29 DIAGNOSIS — Z00.00 WELL ADULT EXAM: Primary | ICD-10-CM

## 2025-05-29 DIAGNOSIS — G95.9 DISEASE OF SPINAL CORD, UNSPECIFIED: ICD-10-CM

## 2025-05-29 DIAGNOSIS — G43.109 MIGRAINE WITH AURA AND WITHOUT STATUS MIGRAINOSUS, NOT INTRACTABLE: ICD-10-CM

## 2025-05-29 DIAGNOSIS — Z80.8 FAMILY HISTORY OF SKIN CANCER: ICD-10-CM

## 2025-05-29 PROCEDURE — 1036F TOBACCO NON-USER: CPT | Performed by: NURSE PRACTITIONER

## 2025-05-29 PROCEDURE — 3008F BODY MASS INDEX DOCD: CPT | Performed by: NURSE PRACTITIONER

## 2025-05-29 PROCEDURE — 99395 PREV VISIT EST AGE 18-39: CPT | Performed by: NURSE PRACTITIONER

## 2025-05-29 RX ORDER — TOPIRAMATE 25 MG/1
25 TABLET, FILM COATED ORAL 2 TIMES DAILY
Qty: 180 TABLET | Refills: 1 | Status: SHIPPED | OUTPATIENT
Start: 2025-05-29 | End: 2025-11-25

## 2025-05-29 ASSESSMENT — ENCOUNTER SYMPTOMS
MUSCULOSKELETAL NEGATIVE: 1
ALLERGIC/IMMUNOLOGIC NEGATIVE: 1
DIZZINESS: 1
CARDIOVASCULAR NEGATIVE: 1
LOSS OF SENSATION IN FEET: 1
HEMATOLOGIC/LYMPHATIC NEGATIVE: 1
ENDOCRINE NEGATIVE: 1
OCCASIONAL FEELINGS OF UNSTEADINESS: 1
EYES NEGATIVE: 1
DEPRESSION: 0
PSYCHIATRIC NEGATIVE: 1
GASTROINTESTINAL NEGATIVE: 1
CONSTITUTIONAL NEGATIVE: 1
LIGHT-HEADEDNESS: 1
RESPIRATORY NEGATIVE: 1

## 2025-05-29 ASSESSMENT — PAIN SCALES - GENERAL: PAINLEVEL_OUTOF10: 4

## 2025-05-29 ASSESSMENT — PATIENT HEALTH QUESTIONNAIRE - PHQ9
2. FEELING DOWN, DEPRESSED OR HOPELESS: NOT AT ALL
1. LITTLE INTEREST OR PLEASURE IN DOING THINGS: NOT AT ALL
SUM OF ALL RESPONSES TO PHQ9 QUESTIONS 1 AND 2: 0

## 2025-05-29 NOTE — PATIENT INSTRUCTIONS

## 2025-05-29 NOTE — PROGRESS NOTES
"Chief Complaint  Aaliyah Townsend \"Dilip\" is a 34 y.o. female presenting for \"Annual Exam (Former Dr Robles pt - wants to rest care- wants to make sure she has a pcp just in case she needs anything/Pt has son and keeps falling and hurting herself).\"    HPI     Aaliyah Townsend \"Dilip\" is a 34 y.o. female presenting to the office for a physical and lab work has POTS and multiple falls sees neurology and rheumatology she was cleared from rheumatology.        Past Medical History  Patient Active Problem List    Diagnosis Date Noted    Disease of spinal cord, unspecified 2025    Migraine with aura and without status migrainosus, not intractable 2024    Syncope 2024    Postoperative visit 2024    Encounter for consultation for female sterilization 2023        Medications  Current Outpatient Medications   Medication Instructions    loratadine (CLARITIN) 5 mg, Daily    rimegepant (Nurtec ODT) 75 mg tablet,disintegrating Dissolve 1 tablet (75 mg) on or under tongue once daily as needed for migraine.    topiramate (TOPAMAX) 25 mg, oral, 2 times daily        Surgical History  She has a past surgical history that includes Other surgical history (2014); orthopedic surgery (Left); Induced ; Nasal septum surgery (2020); Other surgical history; and Salpingectomy (Bilateral).     Social History  She reports that she has never smoked. She has never used smokeless tobacco. She reports that she does not currently use alcohol after a past usage of about 1.0 standard drink of alcohol per week. She reports current drug use. Drug: Marijuana.    Family History  Family History[1]     Allergies  Gabapentin, Amoxicillin, and Penicillin    ROS  Review of Systems   Constitutional: Negative.    HENT: Negative.     Eyes: Negative.    Respiratory: Negative.     Cardiovascular: Negative.    Gastrointestinal: Negative.    Endocrine: Negative.    Genitourinary: Negative.    Musculoskeletal: Negative.  "   Skin: Negative.    Allergic/Immunologic: Negative.    Neurological:  Positive for dizziness and light-headedness.   Hematological: Negative.    Psychiatric/Behavioral: Negative.          Last Recorded Vitals  /82 (BP Location: Left arm, Patient Position: Sitting, BP Cuff Size: Small adult)   Pulse 102   Temp 36.4 °C (97.5 °F)   Resp 18   Wt 52.2 kg (115 lb)   SpO2 99%     Physical Exam  Vitals and nursing note reviewed.   Constitutional:       Appearance: Normal appearance.   HENT:      Head: Normocephalic and atraumatic.      Right Ear: Tympanic membrane, ear canal and external ear normal.      Left Ear: Tympanic membrane, ear canal and external ear normal.      Nose: Nose normal.      Mouth/Throat:      Mouth: Mucous membranes are moist.      Pharynx: Oropharynx is clear.   Eyes:      Extraocular Movements: Extraocular movements intact.      Conjunctiva/sclera: Conjunctivae normal.      Pupils: Pupils are equal, round, and reactive to light.   Neck:      Thyroid: No thyromegaly.   Cardiovascular:      Rate and Rhythm: Normal rate and regular rhythm.      Pulses: Normal pulses.      Heart sounds: Normal heart sounds, S1 normal and S2 normal.   Pulmonary:      Effort: Pulmonary effort is normal.      Breath sounds: Normal breath sounds. No wheezing or rhonchi.   Abdominal:      General: Bowel sounds are normal.      Palpations: Abdomen is soft. There is no mass.      Tenderness: There is no abdominal tenderness. There is no guarding.   Genitourinary:     Comments: Not examined  Musculoskeletal:         General: Normal range of motion.      Cervical back: Normal range of motion.      Right lower leg: No edema.      Left lower leg: No edema.   Lymphadenopathy:      Cervical: No cervical adenopathy.   Skin:     General: Skin is warm and dry.      Capillary Refill: Capillary refill takes less than 2 seconds.      Findings: No rash.   Neurological:      General: No focal deficit present.      Mental Status:  "She is alert and oriented to person, place, and time. Mental status is at baseline.      Cranial Nerves: Cranial nerves 2-12 are intact. No cranial nerve deficit.      Sensory: Sensation is intact.      Motor: Motor function is intact.      Coordination: Coordination is intact.      Gait: Gait is intact.   Psychiatric:         Mood and Affect: Mood normal.         Behavior: Behavior normal.         Thought Content: Thought content normal.         Judgment: Judgment normal.         Relevant Results      Assessment/Plan   Aaliyah \"Dilip\" was seen today for annual exam.  Diagnoses and all orders for this visit:  Well adult exam (Primary)  Disease of spinal cord, unspecified  Comments:  sees neurology  Migraine with aura and without status migrainosus, not intractable  -     topiramate (Topamax) 25 mg tablet; Take 1 tablet (25 mg) by mouth 2 times a day.  Family history of skin cancer  -     Referral to Dermatology          COUNSELING      Medication education:              Education:  The patient is counseled regarding potential side-effects of any and all new medications             Understanding:  Patient expressed understanding             Adherence:  No barriers to adherence identified        Laura KO Svec, APRN-CNP       Patient was identified as a fall risk. Risk prevention instructions provided.         [1]   Family History  Problem Relation Name Age of Onset    Diabetes Mother Sandra Perez     Depression Mother Sandra Perez     Drug abuse Mother Sandra Perez     Mental illness Mother Sandra Perez     COPD Mother Sandra Perez     Cancer Mother Sandra Perez     Endometriosis Father's Sister      Diabetes Maternal Grandmother Kiara Perez     Cancer Maternal Grandmother Kiara Perez     Cancer Maternal Grandfather Kiara Perez     Autoimmune disease Paternal Grandmother Waleska Jose     Miscarriages / Stillbirths Paternal Grandmother Waleska Jose     Stroke Paternal Grandmother Waleska Jose     Cancer Paternal Grandmother Waleska Jose  "    COPD Paternal Grandmother Waleska Jose     Hypertension Paternal Grandmother Waleska Jose     Drug abuse Brother Tee Garcia     Alcohol abuse Brother Tee Garcia     Miscarriages / Stillbirths Sister Yuri Guevaraty     Miscarriages / Stillbirths Other Bindu Camacho (paternal aunt)     Migraines Sister Yuri Jose     Cancer Paternal Grandfather Tee Garcia

## 2025-05-30 ENCOUNTER — HOSPITAL ENCOUNTER (OUTPATIENT)
Dept: RADIOLOGY | Facility: HOSPITAL | Age: 35
Discharge: HOME | End: 2025-05-30
Payer: COMMERCIAL

## 2025-05-30 DIAGNOSIS — N92.1 MENORRHAGIA WITH IRREGULAR CYCLE: ICD-10-CM

## 2025-05-30 PROCEDURE — 76830 TRANSVAGINAL US NON-OB: CPT

## (undated) DEVICE — NEEDLE, INSUFFLATION, 13GAX120MM, DISP

## (undated) DEVICE — DRAPE PACK, LAVH, STERILE

## (undated) DEVICE — Device

## (undated) DEVICE — TROCAR, KII OPTICAL BLADELESS 5MM Z THREAD 100MM LNGTH

## (undated) DEVICE — DISSECTOR, KITTNER, PEANUT, 5MM

## (undated) DEVICE — MANIPULATOR, UTERINE, INJECTOR, 4.5 MM X 33 CM, STERILE, SINGLE-USE ZINNANTI

## (undated) DEVICE — TROCAR, KII OPTICAL SEPARATOR, 8MM X 100MM,  Z-THREAD

## (undated) DEVICE — TUBESET, HIGH FLOW II, 45 LITER PNEUMO SURE, DISP.

## (undated) DEVICE — SLEEVE, KII, Z-THREAD, 5X100CM

## (undated) DEVICE — GOWN, SURGICAL, SIRUS, NON REINFORCED, LARGE

## (undated) DEVICE — GLOVE, SURGICAL, PROTEXIS PI , 5.5, PF, LF

## (undated) DEVICE — DEVICE, POWERSEAL, 5MM 37CM, CURVED JAW

## (undated) DEVICE — GLOVE, SURGICAL, PROTEXIS PI BLUE W/NEUTHERA, 6.0, PF, LF

## (undated) DEVICE — TUBE SET, PNEUMOCLEAR, SMOKE EVACU, HIGH-FLOW

## (undated) DEVICE — SUTURE, MONOCRYL, 4-0, 27 IN, PS-2, UNDYED